# Patient Record
Sex: MALE | Race: WHITE | NOT HISPANIC OR LATINO | URBAN - METROPOLITAN AREA
[De-identification: names, ages, dates, MRNs, and addresses within clinical notes are randomized per-mention and may not be internally consistent; named-entity substitution may affect disease eponyms.]

---

## 2023-09-18 ENCOUNTER — EMERGENCY (EMERGENCY)
Facility: HOSPITAL | Age: 73
LOS: 1 days | Discharge: ANOTHER HOSPITAL | End: 2023-09-18
Attending: EMERGENCY MEDICINE
Payer: MEDICARE

## 2023-09-18 VITALS
SYSTOLIC BLOOD PRESSURE: 137 MMHG | HEART RATE: 71 BPM | OXYGEN SATURATION: 95 % | DIASTOLIC BLOOD PRESSURE: 85 MMHG | RESPIRATION RATE: 18 BRPM | WEIGHT: 195.11 LBS | TEMPERATURE: 98 F

## 2023-09-18 LAB
ALBUMIN SERPL ELPH-MCNC: 3.6 G/DL — SIGNIFICANT CHANGE UP (ref 3.5–5)
ALP SERPL-CCNC: 91 U/L — SIGNIFICANT CHANGE UP (ref 40–120)
ALT FLD-CCNC: 28 U/L DA — SIGNIFICANT CHANGE UP (ref 10–60)
ANION GAP SERPL CALC-SCNC: 6 MMOL/L — SIGNIFICANT CHANGE UP (ref 5–17)
APTT BLD: 30.4 SEC — SIGNIFICANT CHANGE UP (ref 24.5–35.6)
AST SERPL-CCNC: 15 U/L — SIGNIFICANT CHANGE UP (ref 10–40)
BASOPHILS # BLD AUTO: 0.02 K/UL — SIGNIFICANT CHANGE UP (ref 0–0.2)
BASOPHILS NFR BLD AUTO: 0.2 % — SIGNIFICANT CHANGE UP (ref 0–2)
BILIRUB SERPL-MCNC: 0.5 MG/DL — SIGNIFICANT CHANGE UP (ref 0.2–1.2)
BUN SERPL-MCNC: 20 MG/DL — HIGH (ref 7–18)
CALCIUM SERPL-MCNC: 8.7 MG/DL — SIGNIFICANT CHANGE UP (ref 8.4–10.5)
CHLORIDE SERPL-SCNC: 106 MMOL/L — SIGNIFICANT CHANGE UP (ref 96–108)
CO2 SERPL-SCNC: 30 MMOL/L — SIGNIFICANT CHANGE UP (ref 22–31)
CREAT SERPL-MCNC: 1.12 MG/DL — SIGNIFICANT CHANGE UP (ref 0.5–1.3)
EGFR: 69 ML/MIN/1.73M2 — SIGNIFICANT CHANGE UP
EOSINOPHIL # BLD AUTO: 0.04 K/UL — SIGNIFICANT CHANGE UP (ref 0–0.5)
EOSINOPHIL NFR BLD AUTO: 0.4 % — SIGNIFICANT CHANGE UP (ref 0–6)
GLUCOSE SERPL-MCNC: 154 MG/DL — HIGH (ref 70–99)
HCT VFR BLD CALC: 43.9 % — SIGNIFICANT CHANGE UP (ref 39–50)
HGB BLD-MCNC: 15.1 G/DL — SIGNIFICANT CHANGE UP (ref 13–17)
IMM GRANULOCYTES NFR BLD AUTO: 0.2 % — SIGNIFICANT CHANGE UP (ref 0–0.9)
INR BLD: 0.98 RATIO — SIGNIFICANT CHANGE UP (ref 0.85–1.18)
LYMPHOCYTES # BLD AUTO: 1.1 K/UL — SIGNIFICANT CHANGE UP (ref 1–3.3)
LYMPHOCYTES # BLD AUTO: 11.9 % — LOW (ref 13–44)
MCHC RBC-ENTMCNC: 30.9 PG — SIGNIFICANT CHANGE UP (ref 27–34)
MCHC RBC-ENTMCNC: 34.4 GM/DL — SIGNIFICANT CHANGE UP (ref 32–36)
MCV RBC AUTO: 90 FL — SIGNIFICANT CHANGE UP (ref 80–100)
MONOCYTES # BLD AUTO: 0.44 K/UL — SIGNIFICANT CHANGE UP (ref 0–0.9)
MONOCYTES NFR BLD AUTO: 4.8 % — SIGNIFICANT CHANGE UP (ref 2–14)
NEUTROPHILS # BLD AUTO: 7.61 K/UL — HIGH (ref 1.8–7.4)
NEUTROPHILS NFR BLD AUTO: 82.5 % — HIGH (ref 43–77)
NRBC # BLD: 0 /100 WBCS — SIGNIFICANT CHANGE UP (ref 0–0)
PLATELET # BLD AUTO: 217 K/UL — SIGNIFICANT CHANGE UP (ref 150–400)
POTASSIUM SERPL-MCNC: 4.1 MMOL/L — SIGNIFICANT CHANGE UP (ref 3.5–5.3)
POTASSIUM SERPL-SCNC: 4.1 MMOL/L — SIGNIFICANT CHANGE UP (ref 3.5–5.3)
PROT SERPL-MCNC: 6.9 G/DL — SIGNIFICANT CHANGE UP (ref 6–8.3)
PROTHROM AB SERPL-ACNC: 11.2 SEC — SIGNIFICANT CHANGE UP (ref 9.5–13)
RBC # BLD: 4.88 M/UL — SIGNIFICANT CHANGE UP (ref 4.2–5.8)
RBC # FLD: 12.6 % — SIGNIFICANT CHANGE UP (ref 10.3–14.5)
SODIUM SERPL-SCNC: 142 MMOL/L — SIGNIFICANT CHANGE UP (ref 135–145)
TROPONIN I, HIGH SENSITIVITY RESULT: 10 NG/L — SIGNIFICANT CHANGE UP
WBC # BLD: 9.23 K/UL — SIGNIFICANT CHANGE UP (ref 3.8–10.5)
WBC # FLD AUTO: 9.23 K/UL — SIGNIFICANT CHANGE UP (ref 3.8–10.5)

## 2023-09-18 PROCEDURE — 99285 EMERGENCY DEPT VISIT HI MDM: CPT

## 2023-09-18 PROCEDURE — 70450 CT HEAD/BRAIN W/O DYE: CPT | Mod: 26,MA

## 2023-09-18 PROCEDURE — 72125 CT NECK SPINE W/O DYE: CPT | Mod: 26,MA

## 2023-09-18 RX ORDER — TETANUS TOXOID, REDUCED DIPHTHERIA TOXOID AND ACELLULAR PERTUSSIS VACCINE, ADSORBED 5; 2.5; 8; 8; 2.5 [IU]/.5ML; [IU]/.5ML; UG/.5ML; UG/.5ML; UG/.5ML
0.5 SUSPENSION INTRAMUSCULAR ONCE
Refills: 0 | Status: COMPLETED | OUTPATIENT
Start: 2023-09-18 | End: 2023-09-18

## 2023-09-18 NOTE — ED ADULT NURSE NOTE - OBJECTIVE STATEMENT
Patient presented to ED s/p fall at home x today, small abrasion to left side of forehead and back of head, no active bleeding noted. Pt stated to be unsure if LOC. Pt denies any associated pain, dizziness or blurry vision. Steady gait noted.

## 2023-09-18 NOTE — ED PROVIDER NOTE - OBJECTIVE STATEMENT
73 year old male PMH pre-DM coming in with an episode of syncope at home. pt states he got up from kitchen table to get a glass of water and felt a rush and then unsure what happened. wife came home and found the pt on the floor with blood around the area and then pt woke up. unsure how long LOC was for. complains of pain to his head and muffled hearing sensation. states laceration to back of head and small one to forehead. denies all other complaints. 73 year old male PMH pre-DM coming in with an episode of syncope at home. pt states he got up from kitchen table to get a glass of water and felt a rush and then unsure what happened. wife came home and found the pt on the floor with blood around the area and then pt woke up. unsure how long LOC was for. complains of pain to his head and muffled hearing sensation. states laceration to back of head and small one to forehead. denies all other complaints. denies use of ASA or AC. took an advil pta for headache.

## 2023-09-18 NOTE — ED ADULT TRIAGE NOTE - TEMPERATURE IN FAHRENHEIT (DEGREES F)
Impression: Dry eye syndrome of bilateral lacrimal glands: H04.123. Plan: Discussed diagnosis in detail with patient. Discussed treatment options with patient. Patient instructed to use Systane Balance at least qid ou. 97.8

## 2023-09-18 NOTE — ED PROVIDER NOTE - PROGRESS NOTE DETAILS
ct with subarachnoid hemorrhage, intraparenchymal hemorrhage. truama/neurosurg consulted- will transfer to [facility] for eval.

## 2023-09-18 NOTE — ED PROVIDER NOTE - CARE PLAN
Principal Discharge DX:	Subarachnoid hemorrhage  Secondary Diagnosis:	Syncope  Secondary Diagnosis:	Other fracture of skull   1

## 2023-09-18 NOTE — ED ADULT NURSE NOTE - NSFALLUNIVINTERV_ED_ALL_ED
Bed/Stretcher in lowest position, wheels locked, appropriate side rails in place/Call bell, personal items and telephone in reach/Instruct patient to call for assistance before getting out of bed/chair/stretcher/Non-slip footwear applied when patient is off stretcher/[place] to call system/Physically safe environment - no spills, clutter or unnecessary equipment/Purposeful proactive rounding/Room/bathroom lighting operational, light cord in reach

## 2023-09-18 NOTE — ED ADULT TRIAGE NOTE - CHIEF COMPLAINT QUOTE
passed out while in the kitchen with laceration to back of head and forehead, states right ear can't hear well and balance seems not well after fall

## 2023-09-19 ENCOUNTER — INPATIENT (INPATIENT)
Facility: HOSPITAL | Age: 73
LOS: 2 days | Discharge: HOME | DRG: 40 | End: 2023-09-22
Attending: INTERNAL MEDICINE | Admitting: INTERNAL MEDICINE
Payer: MEDICARE

## 2023-09-19 VITALS
WEIGHT: 197.98 LBS | OXYGEN SATURATION: 93 % | RESPIRATION RATE: 18 BRPM | TEMPERATURE: 98 F | HEART RATE: 65 BPM | HEIGHT: 75 IN | DIASTOLIC BLOOD PRESSURE: 84 MMHG | SYSTOLIC BLOOD PRESSURE: 138 MMHG

## 2023-09-19 VITALS
RESPIRATION RATE: 18 BRPM | HEART RATE: 61 BPM | OXYGEN SATURATION: 96 % | TEMPERATURE: 98 F | DIASTOLIC BLOOD PRESSURE: 80 MMHG | SYSTOLIC BLOOD PRESSURE: 136 MMHG

## 2023-09-19 DIAGNOSIS — I60.9 NONTRAUMATIC SUBARACHNOID HEMORRHAGE, UNSPECIFIED: ICD-10-CM

## 2023-09-19 LAB
ALBUMIN SERPL ELPH-MCNC: 4.2 G/DL — SIGNIFICANT CHANGE UP (ref 3.3–5)
ALP SERPL-CCNC: 91 U/L — SIGNIFICANT CHANGE UP (ref 40–120)
ALT FLD-CCNC: 20 U/L — SIGNIFICANT CHANGE UP (ref 10–45)
ANION GAP SERPL CALC-SCNC: 13 MMOL/L — SIGNIFICANT CHANGE UP (ref 5–17)
APTT BLD: 30.1 SEC — SIGNIFICANT CHANGE UP (ref 24.5–35.6)
AST SERPL-CCNC: 20 U/L — SIGNIFICANT CHANGE UP (ref 10–40)
BASOPHILS # BLD AUTO: 0.01 K/UL — SIGNIFICANT CHANGE UP (ref 0–0.2)
BASOPHILS NFR BLD AUTO: 0.1 % — SIGNIFICANT CHANGE UP (ref 0–2)
BILIRUB SERPL-MCNC: 0.6 MG/DL — SIGNIFICANT CHANGE UP (ref 0.2–1.2)
BLD GP AB SCN SERPL QL: NEGATIVE — SIGNIFICANT CHANGE UP
BUN SERPL-MCNC: 19 MG/DL — SIGNIFICANT CHANGE UP (ref 7–23)
CALCIUM SERPL-MCNC: 9.2 MG/DL — SIGNIFICANT CHANGE UP (ref 8.4–10.5)
CHLORIDE SERPL-SCNC: 105 MMOL/L — SIGNIFICANT CHANGE UP (ref 96–108)
CO2 SERPL-SCNC: 24 MMOL/L — SIGNIFICANT CHANGE UP (ref 22–31)
CREAT SERPL-MCNC: 0.92 MG/DL — SIGNIFICANT CHANGE UP (ref 0.5–1.3)
EGFR: 87 ML/MIN/1.73M2 — SIGNIFICANT CHANGE UP
EOSINOPHIL # BLD AUTO: 0.05 K/UL — SIGNIFICANT CHANGE UP (ref 0–0.5)
EOSINOPHIL NFR BLD AUTO: 0.6 % — SIGNIFICANT CHANGE UP (ref 0–6)
GLUCOSE SERPL-MCNC: 115 MG/DL — HIGH (ref 70–99)
HCT VFR BLD CALC: 44.1 % — SIGNIFICANT CHANGE UP (ref 39–50)
HGB BLD-MCNC: 15 G/DL — SIGNIFICANT CHANGE UP (ref 13–17)
IMM GRANULOCYTES NFR BLD AUTO: 0.5 % — SIGNIFICANT CHANGE UP (ref 0–0.9)
INR BLD: 1.08 RATIO — SIGNIFICANT CHANGE UP (ref 0.85–1.18)
LYMPHOCYTES # BLD AUTO: 1.41 K/UL — SIGNIFICANT CHANGE UP (ref 1–3.3)
LYMPHOCYTES # BLD AUTO: 16.4 % — SIGNIFICANT CHANGE UP (ref 13–44)
MCHC RBC-ENTMCNC: 30.8 PG — SIGNIFICANT CHANGE UP (ref 27–34)
MCHC RBC-ENTMCNC: 34 GM/DL — SIGNIFICANT CHANGE UP (ref 32–36)
MCV RBC AUTO: 90.6 FL — SIGNIFICANT CHANGE UP (ref 80–100)
MONOCYTES # BLD AUTO: 0.6 K/UL — SIGNIFICANT CHANGE UP (ref 0–0.9)
MONOCYTES NFR BLD AUTO: 7 % — SIGNIFICANT CHANGE UP (ref 2–14)
NEUTROPHILS # BLD AUTO: 6.51 K/UL — SIGNIFICANT CHANGE UP (ref 1.8–7.4)
NEUTROPHILS NFR BLD AUTO: 75.4 % — SIGNIFICANT CHANGE UP (ref 43–77)
NRBC # BLD: 0 /100 WBCS — SIGNIFICANT CHANGE UP (ref 0–0)
PLATELET # BLD AUTO: 230 K/UL — SIGNIFICANT CHANGE UP (ref 150–400)
POTASSIUM SERPL-MCNC: 3.8 MMOL/L — SIGNIFICANT CHANGE UP (ref 3.5–5.3)
POTASSIUM SERPL-SCNC: 3.8 MMOL/L — SIGNIFICANT CHANGE UP (ref 3.5–5.3)
PROT SERPL-MCNC: 6.9 G/DL — SIGNIFICANT CHANGE UP (ref 6–8.3)
PROTHROM AB SERPL-ACNC: 11.3 SEC — SIGNIFICANT CHANGE UP (ref 9.5–13)
RBC # BLD: 4.87 M/UL — SIGNIFICANT CHANGE UP (ref 4.2–5.8)
RBC # FLD: 12.8 % — SIGNIFICANT CHANGE UP (ref 10.3–14.5)
RH IG SCN BLD-IMP: NEGATIVE — SIGNIFICANT CHANGE UP
SODIUM SERPL-SCNC: 142 MMOL/L — SIGNIFICANT CHANGE UP (ref 135–145)
WBC # BLD: 8.62 K/UL — SIGNIFICANT CHANGE UP (ref 3.8–10.5)
WBC # FLD AUTO: 8.62 K/UL — SIGNIFICANT CHANGE UP (ref 3.8–10.5)

## 2023-09-19 PROCEDURE — 90715 TDAP VACCINE 7 YRS/> IM: CPT

## 2023-09-19 PROCEDURE — 86900 BLOOD TYPING SEROLOGIC ABO: CPT

## 2023-09-19 PROCEDURE — 72125 CT NECK SPINE W/O DYE: CPT | Mod: MA

## 2023-09-19 PROCEDURE — 36415 COLL VENOUS BLD VENIPUNCTURE: CPT

## 2023-09-19 PROCEDURE — 80053 COMPREHEN METABOLIC PANEL: CPT

## 2023-09-19 PROCEDURE — 84484 ASSAY OF TROPONIN QUANT: CPT

## 2023-09-19 PROCEDURE — 70450 CT HEAD/BRAIN W/O DYE: CPT | Mod: 26,MA

## 2023-09-19 PROCEDURE — 86901 BLOOD TYPING SEROLOGIC RH(D): CPT

## 2023-09-19 PROCEDURE — 82962 GLUCOSE BLOOD TEST: CPT

## 2023-09-19 PROCEDURE — 85730 THROMBOPLASTIN TIME PARTIAL: CPT

## 2023-09-19 PROCEDURE — 70450 CT HEAD/BRAIN W/O DYE: CPT | Mod: 26,MA,77

## 2023-09-19 PROCEDURE — 85025 COMPLETE CBC W/AUTO DIFF WBC: CPT

## 2023-09-19 PROCEDURE — 70450 CT HEAD/BRAIN W/O DYE: CPT | Mod: MA

## 2023-09-19 PROCEDURE — 99285 EMERGENCY DEPT VISIT HI MDM: CPT | Mod: 25

## 2023-09-19 PROCEDURE — 85610 PROTHROMBIN TIME: CPT

## 2023-09-19 PROCEDURE — 90471 IMMUNIZATION ADMIN: CPT

## 2023-09-19 PROCEDURE — 99285 EMERGENCY DEPT VISIT HI MDM: CPT

## 2023-09-19 PROCEDURE — 86850 RBC ANTIBODY SCREEN: CPT

## 2023-09-19 RX ORDER — LEVETIRACETAM 250 MG/1
1000 TABLET, FILM COATED ORAL ONCE
Refills: 0 | Status: COMPLETED | OUTPATIENT
Start: 2023-09-19 | End: 2023-09-19

## 2023-09-19 RX ORDER — ACETAMINOPHEN 500 MG
975 TABLET ORAL ONCE
Refills: 0 | Status: COMPLETED | OUTPATIENT
Start: 2023-09-19 | End: 2023-09-19

## 2023-09-19 RX ORDER — LEVETIRACETAM 250 MG/1
500 TABLET, FILM COATED ORAL
Refills: 0 | Status: DISCONTINUED | OUTPATIENT
Start: 2023-09-19 | End: 2023-09-22

## 2023-09-19 RX ADMIN — Medication 975 MILLIGRAM(S): at 22:11

## 2023-09-19 RX ADMIN — Medication 975 MILLIGRAM(S): at 07:47

## 2023-09-19 RX ADMIN — TETANUS TOXOID, REDUCED DIPHTHERIA TOXOID AND ACELLULAR PERTUSSIS VACCINE, ADSORBED 0.5 MILLILITER(S): 5; 2.5; 8; 8; 2.5 SUSPENSION INTRAMUSCULAR at 00:32

## 2023-09-19 RX ADMIN — LEVETIRACETAM 400 MILLIGRAM(S): 250 TABLET, FILM COATED ORAL at 03:24

## 2023-09-19 RX ADMIN — Medication 975 MILLIGRAM(S): at 23:11

## 2023-09-19 NOTE — CONSULT NOTE ADULT - SUBJECTIVE AND OBJECTIVE BOX
CHIEF COMPLAINT:    HISTORY OF PRESENT ILLNESS:  73 year old male PMH pre-DM Presents to the ED as a transfer from  [facility] for subarachnoid hemorrhage after patient had a fall.  Patient transferred to be  seen by neurosurgery.  Patient having no acute complaints right now other than the laceration on his head.  Denying any dizziness, nausea or vomiting.  Pt has had multiple episodes of syncope over the past few years.  feels lightheaded prior to syncope.  He coud be in a sitting position.  Denies tongue biting or urinary incontinence.    PAST MEDICAL & SURGICAL HISTORY:  No pertinent past medical history              MEDICATIONS:                  FAMILY HISTORY:      SOCIAL HISTORY:    [ ] Non-smoker  [ ] Smoker  [ ] Alcohol    Allergies    No Known Allergies    Intolerances    	    REVIEW OF SYSTEMS:  CONSTITUTIONAL: No fever, weight loss, or fatigue  EYES: No eye pain, visual disturbances, or discharge  ENMT:  No difficulty hearing, tinnitus, vertigo; No sinus or throat pain  NECK: No pain or stiffness  RESPIRATORY: No cough, wheezing, chills or hemoptysis; No Shortness of Breath  CARDIOVASCULAR: No chest pain, palpitations, passing out, dizziness, or leg swelling  GASTROINTESTINAL: No abdominal or epigastric pain. No nausea, vomiting, or hematemesis; No diarrhea or constipation. No melena or hematochezia.  GENITOURINARY: No dysuria, frequency, hematuria, or incontinence  NEUROLOGICAL: No headaches, memory loss, loss of strength, numbness, or tremors  SKIN: No itching, burning, rashes, or lesions   LYMPH Nodes: No enlarged glands  ENDOCRINE: No heat or cold intolerance; No hair loss  MUSCULOSKELETAL: No joint pain or swelling; No muscle, back, or extremity pain  PSYCHIATRIC: No depression, anxiety, mood swings, or difficulty sleeping  HEME/LYMPH: No easy bruising, or bleeding gums  ALLERY AND IMMUNOLOGIC: No hives or eczema	    [ ] All others negative	  [ ] Unable to obtain    PHYSICAL EXAM:  T(C): 36.5 (09-19-23 @ 15:41), Max: 36.7 (09-19-23 @ 07:27)  HR: 63 (09-19-23 @ 15:41) (57 - 74)  BP: 122/71 (09-19-23 @ 15:41) (121/78 - 178/113)  RR: 18 (09-19-23 @ 15:41) (16 - 20)  SpO2: 94% (09-19-23 @ 15:41) (93% - 98%)  Wt(kg): --  I&O's Summary      Appearance: Normal	  HEENT:   Normal oral mucosa, PERRL, EOMI	  Lymphatic: No lymphadenopathy  Cardiovascular: Normal S1 S2, No JVD, No murmurs, No edema  Respiratory: Lungs clear to auscultation	  Psychiatry: A & O x 3, Mood & affect appropriate  Gastrointestinal:  Soft, Non-tender, + BS	  Skin: No rashes, No ecchymoses, No cyanosis	  Neurologic: Non-focal  Extremities: Normal range of motion, No clubbing, cyanosis or edema  Vascular: Peripheral pulses palpable 2+ bilaterally    TELEMETRY: 	    ECG:  	  RADIOLOGY:  c< from: CT Head No Cont (09.19.23 @ 10:15) >    ACC: [id number] EXAM:  CT BRAIN   ORDERED BY: TARI PEACOCK DATE:  09/19/2023          INTERPRETATION:  Noncontrast CT of the brain.    CLINICAL INDICATION:  Evaluate for subdural hematoma    TECHNIQUE : Axial CT scanning of the brain was obtained from the skull   base to the vertex without the administration of intravenous contrast.    COMPARISON: Brain CT dated 9/19/2023 at 4:26 AM    FINDINGS:  Redemonstrated left frontal hemorrhagic contusion, unchanged   in appearance.    Redemonstrated is a trace right inferior and left parietal subarachnoid   hemorrhage, both slightly decreased compared with the prior examination.    Redemonstrated nondisplaced right occipital fracture with overlying scalp   hematoma. Unchanged wedge-shaped lucency within the right cerebellar   hemisphere at the level of the fracture may represent nonhemorrhagic   contusion and/or small infarct.    Unchanged linear density in the right anterior parafalcine subarachnoid   space may represent trace subarachnoid hemorrhage versus vessel.    IMPRESSION:    Unchanged left frontal hemorrhagic contusion.  Decreasing right frontal and left parietal subarachnoid hemorrhage.  Unchanged wedge-shaped right cerebellar lucency at the level of the   occipital bone fracture may represent a nonhemorrhagic contusion versus   infarct.  Nondisplaced right occipital fracture with overlying scalp hematoma.    --- End of Report ---            < end of copied text >    OTHER: 	  	  LABS:	 	    CARDIAC MARKERS:                                  15.0   8.62  )-----------( 230      ( 19 Sep 2023 02:58 )             44.1     09-19    142  |  105  |  19  ----------------------------<  115<H>  3.8   |  24  |  0.92    Ca    9.2      19 Sep 2023 02:58    TPro  6.9  /  Alb  4.2  /  TBili  0.6  /  DBili  x   /  AST  20  /  ALT  20  /  AlkPhos  91  09-19    proBNP:   Lipid Profile:   HgA1c:   TSH:            CHIEF COMPLAINT:  Does not see a cardiologist  HISTORY OF PRESENT ILLNESS:  73 year old male PMH pre-DM Presents to the ED as a transfer from  [facility] for subarachnoid hemorrhage after patient had a fall.  Patient transferred to be  seen by neurosurgery.  Patient having no acute complaints right now other than the laceration on his head.  Denying any dizziness, nausea or vomiting.  Pt has had multiple episodes of syncope over the past few years.  feels lightheaded prior to syncope.  He coud be in a sitting position.  Denies tongue biting or urinary incontinence.    PAST MEDICAL & SURGICAL HISTORY:  No pertinent past medical history              MEDICATIONS:                  FAMILY HISTORY:      SOCIAL HISTORY:    [ ] Non-smoker  [ ] Smoker  [ ] Alcohol    Allergies    No Known Allergies    Intolerances    	    REVIEW OF SYSTEMS:  CONSTITUTIONAL: No fever, weight loss, + fatigue  EYES: No eye pain, visual disturbances, or discharge  ENMT:  No difficulty hearing, tinnitus, vertigo; No sinus or throat pain  NECK: No pain or stiffness  RESPIRATORY: No cough, wheezing, chills or hemoptysis; No Shortness of Breath  CARDIOVASCULAR: No chest pain, palpitations, passing out, dizziness, or leg swelling  GASTROINTESTINAL: No abdominal or epigastric pain. No nausea, vomiting, or hematemesis; No diarrhea or constipation. No melena or hematochezia.  GENITOURINARY: No dysuria, frequency, hematuria, or incontinence  NEUROLOGICAL: No headaches, memory loss, loss of strength, numbness, or tremors  SKIN: No itching, burning, rashes, or lesions   LYMPH Nodes: No enlarged glands  ENDOCRINE: No heat or cold intolerance; No hair loss  MUSCULOSKELETAL: No joint pain or swelling; No muscle, back, or extremity pain  PSYCHIATRIC: No depression, anxiety, mood swings, or difficulty sleeping  HEME/LYMPH: No easy bruising, or bleeding gums  ALLERY AND IMMUNOLOGIC: No hives or eczema	    [ ] All others negative	  [ ] Unable to obtain    PHYSICAL EXAM:  T(C): 36.5 (09-19-23 @ 15:41), Max: 36.7 (09-19-23 @ 07:27)  HR: 63 (09-19-23 @ 15:41) (57 - 74)  BP: 122/71 (09-19-23 @ 15:41) (121/78 - 178/113)  RR: 18 (09-19-23 @ 15:41) (16 - 20)  SpO2: 94% (09-19-23 @ 15:41) (93% - 98%)  Wt(kg): --  I&O's Summary      Appearance: Normal	  HEENT:   Normal oral mucosa, PERRL, EOMI	  Lymphatic: No lymphadenopathy  Cardiovascular: Normal S1 S2, No JVD, No murmurs, No edema  Respiratory: Lungs clear to auscultation	  Psychiatry: A & O x 3, Mood & affect appropriate  Gastrointestinal:  Soft, Non-tender, + BS	  Skin: No rashes, No ecchymoses, No cyanosis	  Neurologic: Non-focal  Extremities: Normal range of motion, No clubbing, cyanosis or edema  Vascular: Peripheral pulses palpable 2+ bilaterally    TELEMETRY: SR PACs 	    ECG:  	NSR non specific stt changes   RADIOLOGY:  c< from: CT Head No Cont (09.19.23 @ 10:15) >    ACC: [id number] EXAM:  CT BRAIN   ORDERED BY: TARI SKINNER     PROCEDURE DATE:  09/19/2023          INTERPRETATION:  Noncontrast CT of the brain.    CLINICAL INDICATION:  Evaluate for subdural hematoma    TECHNIQUE : Axial CT scanning of the brain was obtained from the skull   base to the vertex without the administration of intravenous contrast.    COMPARISON: Brain CT dated 9/19/2023 at 4:26 AM    FINDINGS:  Redemonstrated left frontal hemorrhagic contusion, unchanged   in appearance.    Redemonstrated is a trace right inferior and left parietal subarachnoid   hemorrhage, both slightly decreased compared with the prior examination.    Redemonstrated nondisplaced right occipital fracture with overlying scalp   hematoma. Unchanged wedge-shaped lucency within the right cerebellar   hemisphere at the level of the fracture may represent nonhemorrhagic   contusion and/or small infarct.    Unchanged linear density in the right anterior parafalcine subarachnoid   space may represent trace subarachnoid hemorrhage versus vessel.    IMPRESSION:    Unchanged left frontal hemorrhagic contusion.  Decreasing right frontal and left parietal subarachnoid hemorrhage.  Unchanged wedge-shaped right cerebellar lucency at the level of the   occipital bone fracture may represent a nonhemorrhagic contusion versus   infarct.  Nondisplaced right occipital fracture with overlying scalp hematoma.    --- End of Report ---            < end of copied text >    OTHER: 	  	  LABS:	 	    CARDIAC MARKERS:                                  15.0   8.62  )-----------( 230      ( 19 Sep 2023 02:58 )             44.1     09-19    142  |  105  |  19  ----------------------------<  115<H>  3.8   |  24  |  0.92    Ca    9.2      19 Sep 2023 02:58    TPro  6.9  /  Alb  4.2  /  TBili  0.6  /  DBili  x   /  AST  20  /  ALT  20  /  AlkPhos  91  09-19    proBNP:   Lipid Profile:   HgA1c:   TSH:

## 2023-09-19 NOTE — ED PROVIDER NOTE - CARE PLAN
1 Principal Discharge DX:	Subarachnoid hemorrhage   Principal Discharge DX:	Subarachnoid hemorrhage  Secondary Diagnosis:	Syncope

## 2023-09-19 NOTE — ED PROVIDER NOTE - OBJECTIVE STATEMENT
73 year old male PMH pre-DM Presents to the ED as a transfer from  [facility] for subarachnoid hemorrhage after patient had a fall.  Patient transferred to be  seen by neurosurgery.  Patient having no acute complaints right now other than the laceration on his head.  Denying any dizziness, nausea or vomiting.

## 2023-09-19 NOTE — ED PROVIDER NOTE - PHYSICAL EXAMINATION
Const: Well-nourished, Well-developed, appearing stated age.  Eyes: no conjunctival injection, and symmetrical lids.  HEENT: Head NCAT, no lesions. Atraumatic external nose and ears. Moist MM.  Neck: Symmetric, trachea midline.   RESP: Unlabored respiratory effort.   GI: Nontender/Nondistended, No CVA tenderness b/l.   MSK: Normocephalic/Atraumatic,  Skin:  centimeter laceration to occiput and parietal  Neuro: CNs II-XII grossly intact. Motor & Sensation grossly intact.  Psych: Awake, Alert, & Oriented (AAO) x3. Appropriate mood and affect.

## 2023-09-19 NOTE — ED ADULT NURSE NOTE - NSFALLRISKINTERV_ED_ALL_ED

## 2023-09-19 NOTE — CONSULT NOTE ADULT - ASSESSMENT
Jeffrey Smallwood  73M Hx DM p/f fall s/p vasovagal episode. xfer for CTH w/L-frontal contusion. 7h repeat CTH stable. Plt/Coags wnl. No AC/AP  Exam: neurointact   -ok for discharge from neurosurgery perspective once scan read as stable, outpatient follow up with dr. Cordova in 1-2 weeks  -keppra 500 BID for 7d  --160  -hold AC/AP, if the patient is admitted and DVT ppx is otherwise indicated can obtain long interval CTH 12-24h after original, can start dvt chemoppx 24h after if read as stable

## 2023-09-19 NOTE — ED ADULT NURSE REASSESSMENT NOTE - NS ED NURSE REASSESS COMMENT FT1
Neurosurgery at bedside. Safety and comfort measures in place bed in lowest position, siderails upright and call bell within reach.
pt Breathing spontaneous and unlabored on room air, Skin warm and dry and of color appropriate for ethnicity , moves all extremities, speech clear. provider at bedside discussing admission plans and possible echocardiogram with pt. linen changed and meal provided to pt.  no further nurse intervention needed at this time.
Received report from MARISA Martinez .  pt A&Ox4  able to follow all commands. Pulse, motor, sensation present and equal in all 4 extremities. pt Breathing spontaneous and unlabored on lilian air, Skin warm and dry and of color appropriate for ethnicity , moves all extremities, speech clear. pending repeat CT scan, pt complained of pain to head from injury. Tylenol administered as ordered, no further nurse intervention needed at this time.

## 2023-09-19 NOTE — H&P ADULT - HISTORY OF PRESENT ILLNESS
73 year old male PMH pre-DM Presents to the ED as a transfer from  [facility] for subarachnoid hemorrhage after patient had a fall.  Patient transferred to be  seen by neurosurgery.  Patient having no acute complaints right now other than the laceration on his head.  Denying any dizziness, nausea or vomiting.  Pt has had multiple episodes of syncope over the past few years.  feels lightheaded prior to syncope.  He coud be in a sitting position.  Denies tongue biting or urinary incontinence.

## 2023-09-19 NOTE — ED PROVIDER NOTE - CLINICAL SUMMARY MEDICAL DECISION MAKING FREE TEXT BOX
73 year old male PMH pre-DM Presents to the ED as a transfer from  [facility] for subarachnoid hemorrhage after patient had a fall.  Patient transferred to be  seen by neurosurgery.  Patient having no acute complaints right now other than the laceration on his head.  Denying any dizziness, nausea or vomiting.  We will get repeat CT head and consult neurosurgery.

## 2023-09-19 NOTE — ED ADULT NURSE NOTE - OBJECTIVE STATEMENT
PT is a 73 y.o male c.o head bleed. PT is A&Ox3 and resting in stretcher. PT was transferred via EMS PT is a 73 y.o male c.o head bleed. PT is A&Ox3 and resting in stretcher. PT was transferred via EMS from [facility]. Pt had a syncopal episode earlier last night which resulted in head strike. PT had +LOC. PT reports he has had these "episodes" for the past 25 years. PT states he has an "aura, collapses for 30 seconds, and comes back". Pt had CT scan at [facility] which revealed a subdural hematoma. Spontaneously breathing on RA>95%, head lac noted to back of scalp, peripheral pulses present, NIHSS 0. Pt denies any headache, N/V/D, fever, chills, weakness or dizziness. Safety and comfort measures in place bed in lowest position, siderails upright and call bell within reach.

## 2023-09-19 NOTE — H&P ADULT - NSHPLABSRESULTS_GEN_ALL_CORE
LABS:                        15.0   8.62  )-----------( 230      ( 19 Sep 2023 02:58 )             44.1     09-19    142  |  105  |  19  ----------------------------<  115<H>  3.8   |  24  |  0.92    Ca    9.2      19 Sep 2023 02:58    TPro  6.9  /  Alb  4.2  /  TBili  0.6  /  DBili  x   /  AST  20  /  ALT  20  /  AlkPhos  91  09-19    PT/INR - ( 19 Sep 2023 02:58 )   PT: 11.3 sec;   INR: 1.08 ratio         PTT - ( 19 Sep 2023 02:58 )  PTT:30.1 sec  Urinalysis Basic - ( 19 Sep 2023 02:58 )    Color: x / Appearance: x / SG: x / pH: x  Gluc: 115 mg/dL / Ketone: x  / Bili: x / Urobili: x   Blood: x / Protein: x / Nitrite: x   Leuk Esterase: x / RBC: x / WBC x   Sq Epi: x / Non Sq Epi: x / Bacteria: x            RADIOLOGY & ADDITIONAL TESTS:
16

## 2023-09-19 NOTE — ED PROVIDER NOTE - NSFOLLOWUPINSTRUCTIONS_ED_ALL_ED_FT
Please continue taking the medication Keppra every 12 hours for the next week.    Please follow-up with Dr. Torres in his office in 1 week

## 2023-09-19 NOTE — ED PROVIDER NOTE - ATTENDING CONTRIBUTION TO CARE
Attending MD COMFORT Carlos I performed a history and physical exam of the patient and discussed their management with the resident. I reviewed the resident's note and agree with the documented findings and plan of care, except as noted. My medical decision making and observations are as follows:    73 M with PMH pre-DM transferred from [facility] for traumatic subarachnoid hemorrhage.  Patient originally presented to sending facility for syncope and fall with preceding flushing sensation, noted to have laceration to back of head and forehead in subarachnoid hemorrhage imaging.  Patient states he previously had muffled hearing, but this has since resolved.  Denies headache, chest pain, shortness of breath, cough, abdominal pain, GI symptoms, dysuria.  On exam, patient in no acute distress, noted to have superficial lacerations to forehead and occipital scalp.  Moving all extremities, cranial nerves II to XII intact, strength and sensation intact to extremities.  No tenderness to palpation of C-spine, trunk, extremities.    MDM–elderly male presenting with syncope and head strike with noted traumatic subarachnoid hemorrhage and nondisplaced skull fracture, clinically stable.  Will obtain repeat imaging to assess for worsening/interval bleeds.    CT head showing stable anterior inferior left frontal lobe hemorrhage with new foci of subarachnoid hemorrhage in left parietal and right frontal lobes.  Discussed case with neurosurgery, recommending another 4-hour interval CT head but no acute neurosurgical intervention.  Discussed findings with patient and recommended medical admission for evaluation of syncope, patient unsure prefers to wait for results of repeat CT imaging.    0700–admit to Dr. Severino pending repeat CT final disposition.

## 2023-09-19 NOTE — CONSULT NOTE ADULT - SUBJECTIVE AND OBJECTIVE BOX
p ([id number])     HPI: Jeffrey Smallwood  73M Hx DM p/f fall s/p vasovagal episode. xfer for CTH w/L-frontal contusion. 7h repeat CTH stable. Plt/Coags wnl. No AC/AP  Exam: neurointact     --Anticoagulation: denied    =====================  PAST MEDICAL HISTORY     PAST SURGICAL HISTORY     No Known Allergies      MEDICATIONS:  Antibiotics:    Neuro:    Other:      SOCIAL HISTORY:   Occupation:   Marital Status:     FAMILY HISTORY:      ROS: Negative except per HPI    LABS:  PT/INR - ( 19 Sep 2023 02:58 )   PT: 11.3 sec;   INR: 1.08 ratio         PTT - ( 19 Sep 2023 02:58 )  PTT:30.1 sec                        15.0   8.62  )-----------( 230      ( 19 Sep 2023 02:58 )             44.1     09-19    142  |  105  |  19  ----------------------------<  115<H>  3.8   |  24  |  0.92    Ca    9.2      19 Sep 2023 02:58    TPro  6.9  /  Alb  4.2  /  TBili  0.6  /  DBili  x   /  AST  20  /  ALT  20  /  AlkPhos  91  09-19

## 2023-09-19 NOTE — CHART NOTE - NSCHARTNOTEFT_GEN_A_CORE
Repeat interval CTH reviewed w/ stable findings.  -no acute neurosurgical intervention   -Outpatient f/u w/ Dr. Cordova in 1-2 weeks

## 2023-09-19 NOTE — CONSULT NOTE ADULT - ASSESSMENT
73 year old male PMH pre-DM Presents to the ED as a transfer from  [facility] for subarachnoid hemorrhage after patient had a fall.  Patient transferred to be  seen by neurosurgery.  Patient having no acute complaints right now other than the laceration on his head.  Denying any dizziness, nausea or vomiting.  Pt has had multiple episodes of syncope over the past few years.  feels lightheaded prior to syncope.  He coud be in a sitting position.  Denies tongue biting or urinary incontinence.

## 2023-09-19 NOTE — ED PROVIDER NOTE - PROGRESS NOTE DETAILS
Philip Arora, PGY3  neurosurgery at bedside.  Patient is pending repeat CT head. Philip Arora, [id number] with neurosurgery results of patient's CT.  They recommended to get another repeat 4-hour scan to ensure stability.  I discussed this with patient who was upset stating that he was told previously that he would be able to go home by neurosurgery After the first scan.  I discussed this with neurosurgery who Had told the patient that they were waiting for the official results before they could give the final dispo. Philip Arora, PGY3 Patient currently awaiting a repeat CT at 830.  I discussed with patient that they will need a further syncope work-up.  Patient states he is unsure if he would like this done here in the hospital as this has been occurring for over 25 years now.  He is discussing with his wife whether or not he will be amenable to staying for the syncope work-up. Jerald Diaz, DO PGY-3: Repeat head CT grossly stable, spoke with neurosurgery who states no intervention and actually can follow-up outpatient.  Will discuss admission for syncope work-up with patient. pt agreeable to stay for syncope w/u

## 2023-09-20 DIAGNOSIS — R55 SYNCOPE AND COLLAPSE: ICD-10-CM

## 2023-09-20 DIAGNOSIS — I60.9 NONTRAUMATIC SUBARACHNOID HEMORRHAGE, UNSPECIFIED: ICD-10-CM

## 2023-09-20 LAB
ANION GAP SERPL CALC-SCNC: 11 MMOL/L — SIGNIFICANT CHANGE UP (ref 5–17)
BUN SERPL-MCNC: 15 MG/DL — SIGNIFICANT CHANGE UP (ref 7–23)
CALCIUM SERPL-MCNC: 9.1 MG/DL — SIGNIFICANT CHANGE UP (ref 8.4–10.5)
CHLORIDE SERPL-SCNC: 106 MMOL/L — SIGNIFICANT CHANGE UP (ref 96–108)
CO2 SERPL-SCNC: 24 MMOL/L — SIGNIFICANT CHANGE UP (ref 22–31)
CREAT SERPL-MCNC: 0.98 MG/DL — SIGNIFICANT CHANGE UP (ref 0.5–1.3)
EGFR: 80 ML/MIN/1.73M2 — SIGNIFICANT CHANGE UP
FOLATE SERPL-MCNC: 10.9 NG/ML — SIGNIFICANT CHANGE UP
GLUCOSE SERPL-MCNC: 118 MG/DL — HIGH (ref 70–99)
HCT VFR BLD CALC: 45.9 % — SIGNIFICANT CHANGE UP (ref 39–50)
HCV AB S/CO SERPL IA: 0.04 S/CO — SIGNIFICANT CHANGE UP (ref 0–0.99)
HCV AB SERPL-IMP: SIGNIFICANT CHANGE UP
HGB BLD-MCNC: 15.3 G/DL — SIGNIFICANT CHANGE UP (ref 13–17)
MAGNESIUM SERPL-MCNC: 2.2 MG/DL — SIGNIFICANT CHANGE UP (ref 1.6–2.6)
MCHC RBC-ENTMCNC: 30.5 PG — SIGNIFICANT CHANGE UP (ref 27–34)
MCHC RBC-ENTMCNC: 33.3 GM/DL — SIGNIFICANT CHANGE UP (ref 32–36)
MCV RBC AUTO: 91.4 FL — SIGNIFICANT CHANGE UP (ref 80–100)
NRBC # BLD: 0 /100 WBCS — SIGNIFICANT CHANGE UP (ref 0–0)
PHOSPHATE SERPL-MCNC: 2.8 MG/DL — SIGNIFICANT CHANGE UP (ref 2.5–4.5)
PLATELET # BLD AUTO: 213 K/UL — SIGNIFICANT CHANGE UP (ref 150–400)
POTASSIUM SERPL-MCNC: 4.3 MMOL/L — SIGNIFICANT CHANGE UP (ref 3.5–5.3)
POTASSIUM SERPL-SCNC: 4.3 MMOL/L — SIGNIFICANT CHANGE UP (ref 3.5–5.3)
RBC # BLD: 5.02 M/UL — SIGNIFICANT CHANGE UP (ref 4.2–5.8)
RBC # FLD: 12.8 % — SIGNIFICANT CHANGE UP (ref 10.3–14.5)
SODIUM SERPL-SCNC: 141 MMOL/L — SIGNIFICANT CHANGE UP (ref 135–145)
TSH SERPL-MCNC: 0.68 UIU/ML — SIGNIFICANT CHANGE UP (ref 0.27–4.2)
VIT B12 SERPL-MCNC: 397 PG/ML — SIGNIFICANT CHANGE UP (ref 232–1245)
WBC # BLD: 6.52 K/UL — SIGNIFICANT CHANGE UP (ref 3.8–10.5)
WBC # FLD AUTO: 6.52 K/UL — SIGNIFICANT CHANGE UP (ref 3.8–10.5)

## 2023-09-20 PROCEDURE — 93306 TTE W/DOPPLER COMPLETE: CPT | Mod: 26

## 2023-09-20 PROCEDURE — 70547 MR ANGIOGRAPHY NECK W/O DYE: CPT | Mod: 26

## 2023-09-20 PROCEDURE — 70551 MRI BRAIN STEM W/O DYE: CPT | Mod: 26

## 2023-09-20 PROCEDURE — 70544 MR ANGIOGRAPHY HEAD W/O DYE: CPT | Mod: 26,XU

## 2023-09-20 RX ORDER — SODIUM CHLORIDE 9 MG/ML
1000 INJECTION INTRAMUSCULAR; INTRAVENOUS; SUBCUTANEOUS
Refills: 0 | Status: DISCONTINUED | OUTPATIENT
Start: 2023-09-20 | End: 2023-09-21

## 2023-09-20 RX ADMIN — SODIUM CHLORIDE 100 MILLILITER(S): 9 INJECTION INTRAMUSCULAR; INTRAVENOUS; SUBCUTANEOUS at 12:29

## 2023-09-20 RX ADMIN — LEVETIRACETAM 500 MILLIGRAM(S): 250 TABLET, FILM COATED ORAL at 17:31

## 2023-09-20 RX ADMIN — LEVETIRACETAM 500 MILLIGRAM(S): 250 TABLET, FILM COATED ORAL at 05:39

## 2023-09-20 NOTE — PHYSICAL THERAPY INITIAL EVALUATION ADULT - ADDITIONAL COMMENTS
pt lives at home with spouse in a condo, +walkin entrance, +elevator access to 5th floor, PTA ind amb and ADLs, no device, +Driving, +retired, +distance/reading glasses

## 2023-09-20 NOTE — PATIENT PROFILE ADULT - FALL HARM RISK - HARM RISK INTERVENTIONS
Communicate Risk of Fall with Harm to all staff/Monitor gait and stability/Reinforce activity limits and safety measures with patient and family/Tailored Fall Risk Interventions/Visual Cue: Yellow wristband and red socks/Bed in lowest position, wheels locked, appropriate side rails in place/Call bell, personal items and telephone in reach/Instruct patient to call for assistance before getting out of bed or chair/Non-slip footwear when patient is out of bed/[place] to call system/Physically safe environment - no spills, clutter or unnecessary equipment/Purposeful Proactive Rounding/Room/bathroom lighting operational, light cord in reach

## 2023-09-20 NOTE — PROGRESS NOTE ADULT - ASSESSMENT
73 year old male PMH pre-DM Presents to the ED as a transfer from  [facility] for subarachnoid hemorrhage after patient had a fall.  Patient transferred to be  seen by neurosurgery.  Patient having no acute complaints right now other than the laceration on his head.  Denying any dizziness, nausea or vomiting.  Pt has had multiple episodes of syncope over the past few years.  feels lightheaded prior to syncope.  He coud be in a sitting position.  Denies tongue biting or urinary incontinence.    syncope  - tele monitor  - TTE  - positive orthostatics -  start iv fluids  - cardio consult  - neuro consult  - MRI brain w/o  - MRA H/N     SAH  - Repeat interval CTH  w/ stable findings.  -no acute neurosurgical intervention  -Outpatient f/u w/ Dr. Cordova in 1-2 weeks.

## 2023-09-20 NOTE — PROGRESS NOTE ADULT - ASSESSMENT
73 year old male PMH pre-DM Presents to the ED as a transfer from  [facility] for subarachnoid hemorrhage after patient had a fall.  Patient transferred to be  seen by neurosurgery.  Patient having no acute complaints right now other than the laceration on his head.  Denying any dizziness, nausea or vomiting.  Pt has had multiple episodes of syncope over the past few years.  feels lightheaded prior to syncope.  He coud be in a sitting position.  Denies tongue biting or urinary incontinence.

## 2023-09-20 NOTE — PHYSICAL THERAPY INITIAL EVALUATION ADULT - PLANNED THERAPY INTERVENTIONS, PT EVAL
pt is functionally independent, DC from PT program at this time, reconsult if changes occur thank you.

## 2023-09-20 NOTE — CONSULT NOTE ADULT - SUBJECTIVE AND OBJECTIVE BOX
Neurology Consult    Reason for Consult: Patient is a 73y old  Male who presents with a chief complaint of syncope (20 Sep 2023 08:49)      HPI:  73 year old male PMH pre-DM Presents to the ED as a transfer from  [facility] for subarachnoid hemorrhage after patient had a fall.  Patient transferred to be  seen by neurosurgery.  Patient having no acute complaints right now other than the laceration on his head.  Denying any dizziness, nausea or vomiting.  Pt has had multiple episodes of syncope over the past few years.  feels lightheaded prior to syncope.  He coud be in a sitting position.  Denies tongue biting or urinary incontinence. (19 Sep 2023 17:11)       PAST MEDICAL & SURGICAL HISTORY:  No pertinent past medical history          Allergies: Allergies    No Known Allergies    Intolerances        Social History: Denies toxic habits including tobacco, ETOH or illicit drugs.    Family History: FAMILY HISTORY:  . No family history of strokes    Medications: MEDICATIONS  (STANDING):  levETIRAcetam 500 milliGRAM(s) Oral two times a day    MEDICATIONS  (PRN):      Review of Systems:  CONSTITUTIONAL:  No weight loss, fever, chills, weakness or fatigue.  HEENT:  Eyes:  No visual loss, blurred vision, double vision or yellow sclera. Ears, Nose, Throat:  No hearing loss, sneezing, congestion, runny nose or sore throat.  SKIN:  No rash or itching.  CARDIOVASCULAR:  No chest pain, chest pressure or chest discomfort. No palpitations or edema.  RESPIRATORY:  No shortness of breath, cough or sputum.  GASTROINTESTINAL:  No anorexia, nausea, vomiting or diarrhea. No abdominal pain or blood.  GENITOURINARY:  No burning on urination or incontinence   NEUROLOGICAL:  No headache, dizziness,+ syncope, paralysis, ataxia, numbness or tingling in the extremities. No change in bowel or bladder control. no limb weakness. no vision changes.   MUSCULOSKELETAL:  No muscle, back pain, joint pain or stiffness.  HEMATOLOGIC:  No anemia, bleeding or bruising.  LYMPHATICS:  No enlarged nodes. No history of splenectomy.  PSYCHIATRIC:  No history of depression or anxiety.  ENDOCRINOLOGIC:  No reports of sweating, cold or heat intolerance. No polyuria or polydipsia.      Vitals:  Vital Signs Last 24 Hrs  T(C): 36.7 (20 Sep 2023 09:00), Max: 36.7 (19 Sep 2023 21:23)  T(F): 98 (20 Sep 2023 09:00), Max: 98.1 (19 Sep 2023 21:23)  HR: 63 (20 Sep 2023 09:00) (60 - 83)  BP: 125/76 (20 Sep 2023 09:00) (121/78 - 178/113)  BP(mean): 91 (19 Sep 2023 12:10) (91 - 91)  RR: 18 (20 Sep 2023 09:00) (18 - 20)  SpO2: 93% (20 Sep 2023 09:00) (93% - 98%)    Parameters below as of 20 Sep 2023 09:00  Patient On (Oxygen Delivery Method): room air  Orthostatic VS        General Exam:   General Appearance: Appropriately dressed and in no acute distress       Head: Normocephalic, atraumatic and no dysmorphic features  Ear, Nose, and Throat: Moist mucous membranes  CVS: S1S2+  Resp: No SOB, no wheeze or rhonchi  GI: soft NT/ND  Extremities: No edema or cyanosis  Skin: No bruises or rashes     Neurological Exam:  Mental Status: Awake, alert and oriented x 3.  Able to follow simple and complex verbal commands. Able to name and repeat. fluent speech. No obvious aphasia or dysarthria noted.   Cranial Nerves: PERRL, EOMI, VFFC, sensation V1-V3 intact,  no obvious facial asymmetry, equal elevation of palate, scm/trap 5/5, tongue is midline on protrusion. no obvious papilledema on fundoscopic exam. hearing is grossly intact.   Motor: Normal bulk, tone and strength throughout. Fine finger movements were intact and symmetric. no tremors or drift noted.    Sensation: Intact to light touch and pinprick throughout. no right/left confusion. no extinction to tactile on DSS. Romberg was negative.   Reflexes: 1+ throughout at biceps, brachioradialis, triceps, patellars and ankles bilaterally and equal. No clonus. R toe and L toe were both downgoing.  Coordination: No dysmetria on FNF or HKS  Gait: Narrow based and steady. Able to tandem. no limitations in gait.     Data/Labs/Imaging which I personally reviewed.     Labs:     CBC Full  -  ( 20 Sep 2023 07:10 )  WBC Count : 6.52 K/uL  RBC Count : 5.02 M/uL  Hemoglobin : 15.3 g/dL  Hematocrit : 45.9 %  Platelet Count - Automated : 213 K/uL  Mean Cell Volume : 91.4 fl  Mean Cell Hemoglobin : 30.5 pg  Mean Cell Hemoglobin Concentration : 33.3 gm/dL  Auto Neutrophil # : x  Auto Lymphocyte # : x  Auto Monocyte # : x  Auto Eosinophil # : x  Auto Basophil # : x  Auto Neutrophil % : x  Auto Lymphocyte % : x  Auto Monocyte % : x  Auto Eosinophil % : x  Auto Basophil % : x    09-20    141  |  106  |  15  ----------------------------<  118<H>  4.3   |  24  |  0.98    Ca    9.1      20 Sep 2023 07:10  Phos  2.8     09-20  Mg     2.2     09-20    TPro  6.9  /  Alb  4.2  /  TBili  0.6  /  DBili  x   /  AST  20  /  ALT  20  /  AlkPhos  91  09-19    LIVER FUNCTIONS - ( 19 Sep 2023 02:58 )  Alb: 4.2 g/dL / Pro: 6.9 g/dL / ALK PHOS: 91 U/L / ALT: 20 U/L / AST: 20 U/L / GGT: x           PT/INR - ( 19 Sep 2023 02:58 )   PT: 11.3 sec;   INR: 1.08 ratio         PTT - ( 19 Sep 2023 02:58 )  PTT:30.1 sec  Urinalysis Basic - ( 20 Sep 2023 07:10 )    Color: x / Appearance: x / SG: x / pH: x  Gluc: 118 mg/dL / Ketone: x  / Bili: x / Urobili: x   Blood: x / Protein: x / Nitrite: x   Leuk Esterase: x / RBC: x / WBC x   Sq Epi: x / Non Sq Epi: x / Bacteria: x    < from: CT Head No Cont (09.18.23 @ 21:39) >    ACC: [id number] EXAM:  CT CERVICAL SPINE   ORDERED BY: JOSE RODRIGUEZ     ACC: [id number] EXAM:  CT BRAIN   ORDERED BY: JOSE RODRIGUEZ     PROCEDURE DATE:  09/18/2023          INTERPRETATION:  CLINICAL INFORMATION: Syncope and fall.    COMPARISON: None.    PROCEDURE:  Noncontrast CT of the head and cervical spine. Coronal and Sagittal   reformats were obtained.    FINDINGS:    CT head:    Left anterior inferior frontal lobe subarachnoid hemorrhage and   additional tiny rounded foci of hyperdensity, which may reflect tiny   contusions. Tiny hyperdensities within the anterior inferior right   frontal lobe may represent additional foci of hemorrhage versus artifact.   No significant mass effect, midline shift. Tiny right parafalcine   extra-axial hemorrhage (2:30).    The visualized paranasal sinuses and mastoid air cells are clear. Right   parietal scalp soft tissue swelling. Nondisplaced right occipital   calvarial fracture.    CT cervical spine:    No acute cervical spine fracture or evidence of traumatic malalignment.   Nonspecific straightening of the normal cervical lordosis. Craniocervical   junction is unremarkable. No facet joint dislocation. Fusion of the right   C2-C3 facets. No prevertebral soft tissue swelling.    There are multilevel degenerative change of the spine characterized by   degenerative disc disease as well as uncovertebral and facet joint   arthrosis, contributing to varying degrees of mild neuroforaminal and   spinal canal stenoses.    Visualized lung apices are clear.    IMPRESSION:    CT Head: Left anterior inferior frontal lobe subarachnoid hemorrhage and   likely small hemorrhagic parenchymal contusions. Possible tiny foci of   right anterior inferior frontal lobe hemorrhage, however evaluation is   limited by adjacent bone. Tiny focus of right interhemispheric   extra-axial hemorrhage.    Nondisplaced right occipital calvarial fracture with overlying scalp   hematoma.    CT cervical spine: No acute cervical spine fracture or evidence of   traumatic malalignment.    Dr. Love discussed the above findings with Dr. Rodriguez at 10:13 PM on   9/18/2023 with read back.    --- End of Report ---            DEANNE LOVE MD; Attending Radiologist  This document has been electronically signed. Sep 18 2023 10:16PM    < end of copied text >      < from: CT Head No Cont (09.19.23 @ 10:15) >    ACC: [id number] EXAM:  CT BRAIN   ORDERED BY: TARI SKINNER     PROCEDURE DATE:  09/19/2023          INTERPRETATION:  Noncontrast CT of the brain.    CLINICAL INDICATION:  Evaluate for subdural hematoma    TECHNIQUE : Axial CT scanning of the brain was obtained from the skull   base to the vertex without the administration of intravenous contrast.    COMPARISON: Brain CT dated 9/19/2023 at 4:26 AM    FINDINGS:  Redemonstrated left frontal hemorrhagic contusion, unchanged   in appearance.    Redemonstrated is a trace right inferior and left parietal subarachnoid   hemorrhage, both slightly decreased compared with the prior examination.    Redemonstrated nondisplaced right occipital fracture with overlying scalp   hematoma. Unchanged wedge-shaped lucency within the right cerebellar   hemisphere at the level of the fracture may represent nonhemorrhagic   contusion and/or small infarct.    Unchanged linear density in the right anterior parafalcine subarachnoid   space may represent trace subarachnoid hemorrhage versus vessel.    IMPRESSION:    Unchanged left frontal hemorrhagic contusion.  Decreasing right frontal and left parietal subarachnoid hemorrhage.  Unchanged wedge-shaped right cerebellar lucency at the level of the   occipital bone fracture may represent a nonhemorrhagic contusion versus   infarct.  Nondisplaced right occipital fracture with overlying scalp hematoma.    --- End of Report ---            ANGELINA MAGANA MD; Attending Radiologist  This document has been electronicallysigned. Sep 19 2023 10:27AM    < end of copied text >

## 2023-09-20 NOTE — CONSULT NOTE ADULT - ASSESSMENT
73 year old male with pre-DM Presents to the ED as a transfer from  [facility] for subarachnoid hemorrhage after patient had a fall.  Patient transferred to be  seen by neurosurgery.  Patient having no acute complaints right now other than the laceration on his head.  Denying any dizziness, nausea or vomiting.  Pt has had multiple episodes of syncope over the past few years.  feels lightheaded prior to syncope.  He coud be in a sitting position.  Denies tongue biting or urinary incontinence.   CTH L frontal SDAH and contusion.  small right frontal hemorrahge.   CT C spine neg   repeat CTH uhcnaged L frontal hemorrhagic contusion. decrease R and L frontal SAH; R occipital fracture noted   NIHSS 0 premrs 0    Impression:   1) Syncope/fall, states he has several episodes a year for many years   2) trauautic SAH  3) cerebellar hypodensity on R likely nonhemorrhagic contusion vs stroke   4) LE distal numbness likely neuyropahty     - MRI brain w/o  - MRA H/N   - keppra x 7 days per nsx for seizure ppx   - check orthosetatics   - cardiac eval; recs appreicated   - TTE  - rEEG    - Hemoglobin A1c and lipid panel  - telemetry  - PT/OT  - check FS, glucose control <180  - GI/DVT ppx  - Counseling on diet, exercise, and medication adherence was done  - Counseling on smoking cessation and alcohol consumption offered when appropriate.  - Pain assessed and judicious use of narcotics when appropriate was discussed.    - Stroke education given when appropriate.  - Importance of fall prevention discussed.   - Differential diagnosis and plan of care discussed with patient and/or family and primary team  - Thank you for allowing me to participate in the care of this patient. Call with questions.     Kyree Vergara MD  Vascular Neurology  Office: [phone]  73 year old male with pre-DM Presents to the ED as a transfer from  [facility] for subarachnoid hemorrhage after patient had a fall.  Patient transferred to be  seen by neurosurgery.  Patient having no acute complaints right now other than the laceration on his head.  Denying any dizziness, nausea or vomiting.  Pt has had multiple episodes of syncope over the past few years.  feels lightheaded prior to syncope.  He coud be in a sitting position.  Denies tongue biting or urinary incontinence.   CTH L frontal SDAH and contusion.  small right frontal hemorrahge.   CT C spine neg   repeat CTH uhcnaged L frontal hemorrhagic contusion. decrease R and L frontal SAH; R occipital fracture noted   NIHSS 0 premrs 0    Impression:   1) Syncope/fall, states he has several episodes a year for many years   2) trauautic SAH  3) cerebellar hypodensity on R likely nonhemorrhagic contusion vs stroke   4) LE distal numbness likely neuyropahty     - MRI brain w/o  - MRA H/N   - keppra x 7 days per nsx for seizure ppx   - check orthosetatics   - cardiac eval; recs appreicated   - TTE  - rEEG    - Hemoglobin A1c and lipid panel  - telemetry  - can try gabapentin 300 QHS  - emg lowers outpatient   - check b12   - PT/OT  - check FS, glucose control <180  - GI/DVT ppx  - Counseling on diet, exercise, and medication adherence was done  - Counseling on smoking cessation and alcohol consumption offered when appropriate.  - Pain assessed and judicious use of narcotics when appropriate was discussed.    - Stroke education given when appropriate.  - Importance of fall prevention discussed.   - Differential diagnosis and plan of care discussed with patient and/or family and primary team  - Thank you for allowing me to participate in the care of this patient. Call with questions.     Kyree Vergara MD  Vascular Neurology  Office: [phone]

## 2023-09-20 NOTE — PATIENT PROFILE ADULT - FUNCTIONAL ASSESSMENT - BASIC MOBILITY 6.
Outpatient Cardiology Clinic Follow-Up Progress Note  [facility]  [address] [facility] [facility] [address]  Dept Phone: [phone]       Patient Name: Adelina Ricks  MRN:[id number]  :1947      Date: 2023  PCP: Elio Stephens III, MD  Cardiologist: BLADIMIR Tello  Nephrologist:   Discharge date: 2023  NYHA class: III  Weight at discharge: 78.8 kg (173.36 pounds)  Weight at last visit: 82.1 kg (180.62 pounds) with prothesis  Weight today: 79.5 kg (174.9 pounds) without prothesis    Referring Physician  Elio Stephens III, MD  Fax: [phone]    Chief Complaint   Patient presents with   • Office Visit   • Congestive Heart Failure   • Follow-up         HISTORY OF PRESENT ILLNESS  Adelina Ricks is a 75 year old female who presents with past medical history of chronic HFrEF (EF 45%),PAF: recent DCCV  --recurrent AF,S/P BiV, Pacemaker,NIDA, EF 45-50%, fixed defects on recent stress,CAD / CABG , stable anatomy --angio 23,Recent NSTEMI, CKD,HTN, right BKA. She was recently admitted with right finger gangrene with osteomyelitis s/p amputation who has had a prolonged hospitalization complicated by NTSTEMI s/p C with stable disease, left elbow bursitis requiring I&D and abx, urinary retention, hematuria found to have severe cystitis, HF and worsening renal function. She was discharged then readmitted with dyspnea, was diuresed then readmitted again with hypoglycemia.  She was last seen in the HF clinic last week. Patient denies any chest pain, lightheadedness, palpitations or dyspnea. Her weight is stable since discharge.    ASSESSMENT/PLAN  1. Acute on chronic heart failure, unspecified heart failure type (CMS/HCC)    2. Ischemic cardiomyopathy    3. Chronic kidney disease, unspecified CKD stage    4. Hypotension, unspecified hypotension type        Plan:  Patient will continue her current medications.  She will see her PCP as scheduled.  Home  health to draw a BMP next week on patient. Order is in Epic.  Patient will continue to weigh themself daily.  They will call Dr. Tello's office with weight gain of 3 lb in a day or 5 lb in a week.  Reinforced the importance of a low sodium diet (2000 mg/day) as well as a fluid restriction of 64 fl oz a day   Call the HF Clinic with any questions or concerns        Orders Placed This Encounter   • NT proBNP   • Comprehensive Metabolic Panel   • Comprehensive Metabolic Panel   • digoxin (LANOXIN) 0.125 MG tablet   • pregabalin (LYRICA) 100 MG capsule   • losartan (COZAAR) 25 MG tablet   • Omega-3 Fatty Acids (FISH OIL OMEGA-3 PO)   • insulin detemir (LEVEMIR) 100 UNIT/ML injectable solution        Return to Clinic: No follow-ups on file. Patient instructed to have all ordered testing prior to follow-up visit.     REVIEW OF SYSTEMS  A 12-point Review of Systems was performed and is negative except for HPI.     PHYSICAL EXAM  Vitals:    02/13/23 1237 02/13/23 1305 02/13/23 1308   BP:  92/50 102/56   BP Location:  RUE - Right upper extremity RUE - Right upper extremity   Patient Position:  Sitting Sitting   Cuff Size:  Small Adult Small Adult   Pulse:  70    Weight: 79.5 kg (175 lb 4.3 oz)         Body mass index is 31.05 kg/m².    Wt Readings from Last 4 Encounters:   02/13/23 79.5 kg (175 lb 4.3 oz)   02/07/23 82.1 kg (181 lb)   02/06/23 82.1 kg (181 lb)   02/05/23 78.8 kg (173 lb 11.6 oz)       Vitals and weights were reviewed during today's visit.    Gen: no acute distress, AOx3  Neck: Supple, no JVP  CV: RRR, S1, S2, No G/R/M  Chest: Lungs BCTA, non-labored respirations   Ext: warm, well perfused, no LE edema    CARDIOVASCULAR DIAGNOSTIC STUDIES    I personally reviewed: labs, notes echo  Discussed with: patient        Transthoracic Echocardiogram: 1/21/23  STUDY CONCLUSIONS  SUMMARY:     1. Procedure narrative: A transthoracic echocardiogram was performed. Image     quality was suboptimal. The study was  technically limited due to body     habitus.  2. Left ventricle: The cavity size is normal. Wall thickness is increased.     Systolic function is mildly reduced. The ejection fraction was measured by     visual estimation. The ejection fraction is 45%.  3. Aortic valve: Prior repair procedures include TAVR. There is no significant     regurgitation. The mean systolic gradient is 11mm Hg. The peak systolic     gradient is 17mm Hg.  4. Mitral valve: The annulus is moderately calcified. The leaflets are     moderately thickened and moderately calcified. Leaflet separation is     reduced. Inflow velocity is increased. The findings are consistent with     mild to moderate stenosis. There is trivial regurgitation. The mean     diastolic gradient is 6mm Hg.  5. Left atrium: The atrium is dilated.  6. Right ventricle: The cavity size is normal. Wall thickness is normal.     Systolic function is normal. Pacemaker lead noted in right ventricle.  7. Right atrium: Pacemaker lead noted in right atrium.  8. Pericardium, extracardiac: There is no pericardial effusion.  9. Paced rhythm.  IMPRESSIONS:   The study shows no significant change since the previous study.        Recent Labs     04/06/22  1225 06/04/22  1755 07/11/22  0845 09/13/22  0832 09/28/22  2317 09/29/22  0252 01/06/23  0842 02/02/23  0902 02/04/23  1759 02/04/23  1922 02/06/23  1322 02/13/23  1245   CHOLESTEROL  --   --  135 147  --  146  --   --   --   --   --   --    CALCLDL  --   --   --  54  --  49  --   --   --   --   --   --    HDL  --   --  25* 31*  --  24*  --   --   --   --   --   --    TRIGLYCERIDE  --   --  469* 310*  --  366*  --   --   --   --   --   --    AST  --    < > 30 20   < >  --    < > 27 40*  --   --  22   SODIUM 142   < > 135 138   < > 137   < > 141 142  --  141 138   CHLORIDE 109*   < > 101 105   < > 103   < > 103 106  --  103 100   BUN 15   < > 17 15   < > 14   < > 24* 30*  --  31* 25*   BCRAT 19   < > 19 17   < > 16   < > 18 22  --  22 22    POTASSIUM 3.9   < > 4.3 3.6   < > 3.8   < > 3.9 5.5* 4.4 4.4 4.1   GLUCOSE 177*   < > 337* 213*   < > 396*   < > 149* 100*  --  236* 235*   CREATININE 0.77   < > 0.88 0.90   < > 0.86   < > 1.33* 1.39*  --  1.44* 1.14*   CALCIUM 9.7   < > 9.3 8.9   < > 9.1   < > 9.0 8.9  --  8.9 9.5   TSH  --   --  0.330* 0.418  --   --   --   --   --   --   --   --    HGBA1C 10.4*  --  11.4* 11.0*  --   --   --   --   --   --   --   --     < > = values in this interval not displayed.     Lab Results   Component Value Date/Time    T4FREE 1.4 07/11/2022 08:45 AM    T4FREE 1.6 07/12/2018 12:00 AM     Recent Labs     02/02/23  0902 02/04/23  1759 02/09/23  1049   WBC 4.1* 4.8 5.0   RBC 4.26 4.27 4.37   HGB 12.1 12.1 12.6   HCT 38.6 38.6 40.4   MCV 90.6 90.4 92.4   MCHC 31.3* 31.3* 31.2*   RDWCV 18.7* 18.6* 18.7*    333 267   TLYMPH 32 35 31     Lab Results   Component Value Date/Time    ALBUMIN 2.6 (L) 02/13/2023 12:45 PM    ALBUMIN 3.0 (L) 06/02/2020 02:50 PM    BILIRUBIN 0.7 02/13/2023 12:45 PM    BILIRUBIN 0.6 06/02/2020 02:50 PM    ALKPT 95 02/13/2023 12:45 PM    ALKPT 117 06/02/2020 02:50 PM    GPT 14 02/13/2023 12:45 PM    GPT 28 06/02/2020 02:50 PM       PATIENT HISTORIES    Past Medical History:   Diagnosis Date   • Acute osteomyelitis of right foot (CMS/[facility]) 09/14/2015   • MINE (acute kidney injury) (CMS/[facility]) 1/7/2023   • Amputated right leg (CMS/HCC)    • Arthritis    • Atrial fibrillation (CMS/HCC)    • Atrioventricular block     2/97 [facility]/nonobstructive disease, 3rd degree AV block, 5/08, 6/12   • Carotid artery stenosis, asymptomatic, unspecified laterality    • Complex regional pain syndrome I    • Coronary artery disease    • Diabetes mellitus (CMS/HCC)    • Diabetic neuropathy (CMS/HCC)      diabetic neuropathy lower extremities   • Dyslipidemia    • Essential hypertension    • High cholesterol    • Hyperlipoproteinemia    • Malignant neoplasm (CMS/HCC)     breast   • MI, acute, non ST segment  elevation (CMS/[facility]) 03/03/2016 1/2016   • Neuroma of amputation stump, right lower extremity (CMS/[facility]) 06/15/2021   • Post-operative infection 03/24/2020    [facility]:Right foot/leg   • Sleep apnea     uses CPAP, setting 9   • Thyroid disease    • Vitamin D deficiency        Past Surgical History:   Procedure Laterality Date   • Amputation      Rt. great toe 07/15   • Anes cataract surgery,complex     • Breast lumpectomy Left    • Cardiac device check - in clinic      [employer] pacemaker 5/16 - upgrade to Bi-V pacer 11/18   • Cardiac surgery     • Cardioversion  01/2023   • Case request clinic to cath/vasc      January 2016   • Colectomy       colon resection 1/13   • Coronary artery bypass graft     • Cystoscopy     • Finger amputation Right 01/08/2023    Middle Finger; Partial   • Finger surgery  2013   • Hysterectomy     • Joint replacement Bilateral     knee   • Knee surgery Right 11/2004   • Other surgical history      [id number]   • Pancreas surgery  08/16/2022    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 02/10/16.   • Pr drug-eluting stents, single      diagonal, drug eluting stent February 2016,   • Revision of eyelid,< 1/4 lid margin     • Saphenous vein graft resection      saphenous vein graft to the RV lateral branch 02/19/16   • Total knee replacement Bilateral        Family History     Relation Problem Comments    Maternal Grandfather Coronary Artery Disease    Heart disease        Mother Coronary Artery Disease        Other Coronary Artery Disease  Significant for premature CAD.              Social History     Tobacco Use   • Smoking status: Former     Types: Cigarettes     Quit date: 1980      Years since quittin.1   • Smokeless tobacco: Never   • Tobacco comments:      Former tobacco use. used to smoke but quit and 1980   Vaping Use   • Vaping Use: never used   Substance Use Topics   • Alcohol use: Yes     Alcohol/week: 1.0 standard drink     Types: 1 Shots of liquor per week     Comment: drinks socially and 1 drink per month.   • Drug use: Not Currently       Allergies   Allergen Reactions   • Alprazolam RASH   • Clindamycin Other (See Comments)   • Levofloxacin Other (See Comments)   • Sulfa Antibiotics Other (See Comments)   • Vancomycin PRURITUS   • Epinephrine Other (See Comments)     Jittery, rapid heart beat   • Adhesive   (Environmental) Other (See Comments)     Cloth bandage Causes skin breakdown.    • Erythromycin DIARRHEA     diarrhea     • Hydrocodone-Acetaminophen ANXIETY   • Tetracycline DIARRHEA   • Valacyclovir Hcl GI UPSET              Current Medications    ACCU-CHEK GUIDE TEST STRIP    CHECK BLOOD SUGAR THREE TIMES DAILY    APIXABAN (ELIQUIS) 5 MG TAB    Take 1 tablet by mouth every 12 hours.    ASPIRIN 81 MG EC TABLET    Take 81 mg by mouth daily.    ATORVASTATIN (LIPITOR) 80 MG TABLET    Take 1 tablet by mouth daily.    B COMPLEX VITAMINS (B COMPLEX PO)    Take 1 tablet by mouth daily.    BD INSULIN SYRINGE U/F 31G X \" 0.5 ML MISC    USE AND DISCARD THREE TIMES A DAY.    BLOOD GLUCOSE MONITORING SUPPL (ACCU-CHEK GUIDE) W/DEVICE KIT    accu-chek guide  w/device kit    CLOPIDOGREL (PLAVIX) 75 MG TABLET    TAKE 1 TABLET BY MOUTH EVERY DAY    DICLOFENAC (VOLTAREN) 1 % GEL    Apply 2 g topically in the morning and 2 g at noon and 2 g in the evening. Apply to hands    DIGOXIN (LANOXIN) 0.125 MG TABLET    125 mcg daily.    HUMALOG 100 UNIT/ML INJECTABLE SOLUTION    Inject 4 Units into the skin in the morning and 4 Units at noon and 4 Units in the evening. Inject before meals. Indications: Type 2 Diabetes.    HYDROXYZINE (ATARAX) 50 MG TABLET    Take 1 tablet by mouth every 6 hours  as needed for Anxiety.    INSULIN DETEMIR (LEVEMIR) 100 UNIT/ML INJECTABLE SOLUTION    Inject 12 Units into the skin nightly.    INSULIN PEN NEEDLE 32G X 4 MM MISC    Use as directed    ISOSORBIDE DINITRATE (ISORDIL) 10 MG TABLET    Take 1 tablet by mouth in the morning and 1 tablet at noon and 1 tablet in the evening.    JARDIANCE 25 MG TABLET    TAKE 1 TABLET BY MOUTH EVERY DAY    LEVOTHYROXINE 150 MCG TABLET    TAKE 1 TABLET BY MOUTH EVERY MORNING ON AN EMPTY STOMACH    LIDOCAINE (XYLOCAINE) 5 % OINTMENT    Apply topically as needed for Pain (apply to hands for pain control).    LOSARTAN (COZAAR) 25 MG TABLET    Take 12.5 mg by mouth daily.    METRONIDAZOLE (FLAGYL) 500 MG TABLET    Take 1 tablet by mouth in the morning and 1 tablet at noon and 1 tablet in the evening. Do all this for 14 days.    OMEGA-3 FATTY ACIDS (FISH OIL OMEGA-3 PO)    Take 4,000 mg by mouth daily.    ONDANSETRON (ZOFRAN) 4 MG TABLET    Take 1 tablet by mouth every 8 hours as needed for Nausea (prior to antibiotic infusion).    POLYETHYLENE GLYCOL 400 (BLINK TEARS OP)    Apply 1 drop to eye as needed (dry eyes).    POTASSIUM CHLORIDE (KLOR-CON M) 20 MEQ PAMELLA ER TABLET    Take 1 tablet by mouth in the morning and 1 tablet in the evening. Do not start before February 2, 2023.    PREGABALIN (LYRICA) 100 MG CAPSULE    Take 100 mg by mouth in the morning and 100 mg at noon and 100 mg in the evening.    SENNOSIDES-DOCUSATE SODIUM (SENOKOT-S) 8.6-50 MG TABLET    Take 1 tablet by mouth daily.    TORSEMIDE (DEMADEX) 20 MG TABLET    Take 1 tablet by mouth 2 times daily. Do not start before February 2, 2023.    TRAMADOL (ULTRAM) 50 MG TABLET    Take 1 tablet by mouth every 6 hours as needed for Pain.         For this visit I performed pre-charting, chart review, documenting and referring/communicating with other health care professionals. Allergies and Medications were reviewed with the patient and updated during today's visit. In addition, I discussed  medication dosage, usage, goals of therapy, and side effects with patient. Medication reconciliation was done but medications not prescribed by me may be inaccurate. The patient's previous laboratory and cardiac diagnostic testing listed above has been reviewed and analyzed to establish my current plan of care. Previous outside notes were reviewed and taken into consideration when deciding further treatment.    Amanda Gan, CNP  Heart Failure Nurse Practitioner                      4 = No assist / stand by assistance

## 2023-09-20 NOTE — PHYSICAL THERAPY INITIAL EVALUATION ADULT - GENERAL OBSERVATIONS, REHAB EVAL
Pt is s/p fall, Acute SAH 2/2 fall- CTH w/ stable right frontal/left parietal SAH, no intervention per neurosx. Pt received supine in bed on stretcher (orange 66L), +IVL, A&OX4, +tele.

## 2023-09-20 NOTE — PHYSICAL THERAPY INITIAL EVALUATION ADULT - NSPTDISCHREC_GEN_A_CORE
DC home with no skilled PT needs, assist from spouse as needed, JENNI Anthony aware./No skilled PT needs

## 2023-09-20 NOTE — PHYSICAL THERAPY INITIAL EVALUATION ADULT - PERTINENT HX OF CURRENT PROBLEM, REHAB EVAL
Pt is a 73 year old male admitted to [facility] on 9/19/23 PMH pre-DM Presents to the ED as a transfer from  [facility] for subarachnoid hemorrhage after patient had a fall.  Pt transferred to be  seen by neurosurgery.  Pt having no acute complaints right now other than the laceration on his head.  Denying any dizziness, nausea or vomiting. Pt has had multiple episodes of syncope over the past few years. feels lightheaded prior to syncope.  He coud be in a sitting position.  Denies tongue biting or urinary incontinence. Hospital course:  Repeat interval CTH  w/ stable findings. no acute neurosurgical intervention, +tele monitor, +TTE, orthostatics. Per neurosx, s/p vasovagal episode. xfer for CTH w/L-frontal contusion. 7h repeat CTH stable. Plt/Coags wnl. No AC/AP. CT Head: Left anterior inferior frontal lobe subarachnoid hemorrhage and likely small hemorrhagic parenchymal contusions. Possible tiny foci of right anterior inferior frontal lobe hemorrhage, however evaluation is limited by adjacent bone. Tiny focus of right interhemispheric extra-axial hemorrhage. Nondisplaced right occipital calvarial fracture with overlying scalp hematoma. CT cervical spine: No acute cervical spine fracture or evidence of traumatic malalignment. 2nd CTH: Tiny focus of new subarachnoid hemorrhage in the anterior left   parietal lobe. Stable hemorrhagic contusion in the anterior inferior left frontal lobe. Stable tiny focus of subarachnoid hemorrhage in the anterior inferior right frontal lobe. Improving tiny focus of subarachnoid hemorrhage in the anterior right parafalcine region.Nondisplaced right occipital fracture. 3rd CTH: Unchanged left frontal hemorrhagic contusion. Decreasing right frontal and left parietal subarachnoid hemorrhage. Unchanged wedge-shaped right cerebellar lucency at the level of the occipital bone fracture may represent a nonhemorrhagic contusion versus infarct. Nondisplaced right occipital fracture with overlying scalp hematoma.

## 2023-09-21 RX ORDER — ACETAMINOPHEN 500 MG
650 TABLET ORAL ONCE
Refills: 0 | Status: COMPLETED | OUTPATIENT
Start: 2023-09-21 | End: 2023-09-21

## 2023-09-21 RX ORDER — DIPHENHYDRAMINE HCL 50 MG
25 CAPSULE ORAL ONCE
Refills: 0 | Status: COMPLETED | OUTPATIENT
Start: 2023-09-21 | End: 2023-09-21

## 2023-09-21 RX ORDER — METOCLOPRAMIDE HCL 10 MG
10 TABLET ORAL ONCE
Refills: 0 | Status: COMPLETED | OUTPATIENT
Start: 2023-09-21 | End: 2023-09-21

## 2023-09-21 RX ORDER — KETOROLAC TROMETHAMINE 30 MG/ML
30 SYRINGE (ML) INJECTION ONCE
Refills: 0 | Status: DISCONTINUED | OUTPATIENT
Start: 2023-09-21 | End: 2023-09-21

## 2023-09-21 RX ADMIN — Medication 650 MILLIGRAM(S): at 16:34

## 2023-09-21 RX ADMIN — SODIUM CHLORIDE 100 MILLILITER(S): 9 INJECTION INTRAMUSCULAR; INTRAVENOUS; SUBCUTANEOUS at 09:10

## 2023-09-21 RX ADMIN — Medication 650 MILLIGRAM(S): at 15:46

## 2023-09-21 RX ADMIN — LEVETIRACETAM 500 MILLIGRAM(S): 250 TABLET, FILM COATED ORAL at 06:33

## 2023-09-21 RX ADMIN — LEVETIRACETAM 500 MILLIGRAM(S): 250 TABLET, FILM COATED ORAL at 17:57

## 2023-09-21 NOTE — PROGRESS NOTE ADULT - ASSESSMENT
73 year old male PMH pre-DM Presents to the ED as a transfer from  [facility] for subarachnoid hemorrhage after patient had a fall.  Patient transferred to be  seen by neurosurgery.  Patient having no acute complaints right now other than the laceration on his head.  Denying any dizziness, nausea or vomiting.  Pt has had multiple episodes of syncope over the past few years.  feels lightheaded prior to syncope.  He coud be in a sitting position.  Denies tongue biting or urinary incontinence.

## 2023-09-21 NOTE — PROGRESS NOTE ADULT - ASSESSMENT
73 year old male with pre-DM Presents to the ED as a transfer from  [facility] for subarachnoid hemorrhage after patient had a fall.  Patient transferred to be  seen by neurosurgery.  Patient having no acute complaints right now other than the laceration on his head.  Denying any dizziness, nausea or vomiting.  Pt has had multiple episodes of syncope over the past few years.  feels lightheaded prior to syncope.  He coud be in a sitting position.  Denies tongue biting or urinary incontinence.   CTH L frontal SDAH and contusion.  small right frontal hemorrahge.   CT C spine neg   repeat CTH uhcnaged L frontal hemorrhagic contusion. 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  SAH from prior   MRA H/N Neg   b12 397   TTE EF 60%     Impression:   1) Syncope/fall, states he has several episodes a year for many years   2) trauautic SAH  3) cerebellar hypodensity on R likely nonhemorrhagic contusion vs stroke   4) LE distal numbness likely neuyropahty   5) emoblic apearing R cerebellar and L frontal infarct, ESUS        - ASA 81mg when cleared by neurosx  - high dose statin, atorvastatin 80mg QHS  - keppra x 7 days per nsx for seizure ppx   - check orthostatics   - cardiac eval; recs appreicated   - rEEG    - Hemoglobin A1c and lipid panel  - telemetry; would benefit from extended cardiac monitoring   - can try gabapentin 300 QHS  - emg lowers outpatient   - check b12 --> low. would supplement   - PT/OT  - check FS, glucose control <180  - GI/DVT ppx   - Thank you for allowing me to participate in the care of this patient. Call with questions.     Kyree Vergara MD  Vascular Neurology  Office: [phone]

## 2023-09-21 NOTE — PROGRESS NOTE ADULT - ASSESSMENT
73 year old male PMH pre-DM Presents to the ED as a transfer from  [facility] for subarachnoid hemorrhage after patient had a fall.  Patient transferred to be  seen by neurosurgery.  Patient having no acute complaints right now other than the laceration on his head.  Denying any dizziness, nausea or vomiting.  Pt has had multiple episodes of syncope over the past few years.  feels lightheaded prior to syncope.  He coud be in a sitting position.  Denies tongue biting or urinary incontinence.    syncope  - tele monitor  - TTE  - positive orthostatics -  start iv fluids  - cardio consult  - neuro consult  - MRI brain w/o MRA H/N shows acute cva  - ep called for ILR    SAH  - Repeat interval CTH  w/ stable findings.  -no acute neurosurgical intervention  -Outpatient f/u w/ Dr. Cordova in 1-2 weeks.     73 year old male PMH pre-DM Presents to the ED as a transfer from  [facility] for subarachnoid hemorrhage after patient had a fall.  Patient transferred to be  seen by neurosurgery.  Patient having no acute complaints right now other than the laceration on his head.  Denying any dizziness, nausea or vomiting.  Pt has had multiple episodes of syncope over the past few years.  feels lightheaded prior to syncope.  He coud be in a sitting position.  Denies tongue biting or urinary incontinence.    syncope  - tele monitor  - TTE  - positive orthostatics -  d/c iv fluids  - cardio consult  - neuro consult  - MRI brain w/o MRA H/N shows acute cva  - ep called for ILR    SAH  - Repeat interval CTH  w/ stable findings.  -no acute neurosurgical intervention  -Outpatient f/u w/ Dr. Cordova in 1-2 weeks.

## 2023-09-21 NOTE — CONSULT NOTE ADULT - SUBJECTIVE AND OBJECTIVE BOX
HISTORY OF PRESENT ILLNESS: HPI:  73 year old male PMH pre-DM Presents to the ED as a transfer from  [facility] for subarachnoid hemorrhage after patient had a fall.  Patient transferred to be seen by neurosurgery.  Patient having no acute complaints right now other than the laceration on his head.  Denying any dizziness, nausea or vomiting.  Pt has had multiple episodes of syncope over the past few years.  feels lightheaded prior to syncope.  He coud be in a sitting position.  Denies tongue biting or urinary incontinence. (19 Sep 2023 17:11)    MRI with multiple acute CVAs.  He reports mild headache, otherwise comfortable.  No prior cardiac work up.      PAST MEDICAL & SURGICAL HISTORY:  No pertinent past medical history      MEDICATIONS  (STANDING):  acetaminophen     Tablet .. 650 milliGRAM(s) Oral once  levETIRAcetam 500 milliGRAM(s) Oral two times a day      Allergies  No Known Allergies      FAMILY HISTORY:  Noncontributory for premature coronary disease or sudden cardiac death    SOCIAL HISTORY:    [x ] Non-smoker  [ ] Smoker  [ ] Alcohol    FLU VACCINE THIS YEAR STARTS IN AUGUST:  [ ] Yes    [ ] No    IF OVER 65 HAVE YOU EVER HAD A PNA VACCINE:  [ ] Yes    [ ] No       [ ] N/A      REVIEW OF SYSTEMS:  [ ]chest pain  [  ]shortness of breath  [  ]palpitations  [  ]syncope  [ ]near syncope [ ]upper extremity weakness   [ ] lower extremity weakness  [  ]diplopia  [  ]altered mental status   [  ]fevers  [ ]chills [ ]nausea  [ ]vomiting  [  ]dysphagia    [ ]abdominal pain  [ ]melena  [ ]BRBPR    [  ]epistaxis  [  ]rash    [ ]lower extremity edema        [x ] All others negative	  [ ] Unable to obtain      LABS:	 	               15.3   6.52  )-----------( 213      ( 20 Sep 2023 07:10 )             45.9     141  |  106  |  15  ----------------------------<  118<H>  4.3   |  24  |  0.98    Ca    9.1      20 Sep 2023 07:10  Phos  2.8     09-20  Mg     2.2     09-20    Creatinine Trend: 0.98<--, 0.92<--, 1.12<--      PHYSICAL EXAM:  T(C): 36.8 (09-21-23 @ 14:40), Max: 37.1 (09-20-23 @ 21:35)  HR: 56 (09-21-23 @ 14:40) (56 - 118)  BP: 122/69 (09-21-23 @ 14:40) (122/69 - 147/82)  RR: 18 (09-21-23 @ 14:40) (18 - 18)  SpO2: 95% (09-21-23 @ 14:40) (93% - 97%)  Wt(kg): --   BMI (kg/m2): 24.7 (09-19-23 @ 01:41)    Gen: Appears well in NAD  HEENT:  (-)icterus (-)pallor  CV: N S1 S2 1/6 MICHAEL (+)2 Pulses B/l  Resp:  Clear to ausculatation B/L, normal effort  GI: (+) BS Soft, NT, ND  Lymph:  (-)Edema, (-)obvious lymphadenopathy  Skin: Warm to touch, Normal turgor  Psych: Appropriate mood and affect      TELEMETRY: 	SR      ECG:  	NSR    < from: MR Angio Neck No Cont (09.20.23 @ 12:48) >  IMPRESSION:    MRI BRAIN: Acute right cerebellum and left frontal lobe infarctions.   Stable left frontal lobe hemorrhagic contusion with moderate adjacent   edema. Trace intraventricular hemorrhage in the left occipital horn. The   patient's known trace subarachnoid hemorrhage in the right frontal lobe   and left parietal lobe are better seen on the recent head CT.   Nondisplaced right parietal occipital calvarial fracture.      MRA BRAIN: No large vessel occlusion.    MRA NECK: No significant stenosis or large vessel occlusion.    < end of copied text >    < from: TTE W or WO Ultrasound Enhancing Agent (09.20.23 @ 13:40) >  CONCLUSIONS:      1. Leftventricular systolic function is normal with an ejection fraction of 60 % by Camargo's method of disks.   2. There is normal left ventricular diastolic function.   3. Right ventricular cavity is normal in size and normal systolic function.   4. Mild aortic regurgitation.   5. No prior echocardiogram is available for comparison.    < end of copied text >      ASSESSMENT/PLAN: 	73 year old male PMH pre-DM s/p syncope with SAH and 2 acute CVAs on MRI.  EP consulted for syncope and ILR eval    --MRI noted with embolic strokes  --Neuro eval noted  --no events on tele thus far  --d/w patient and wife re: ILR implant for extended cardiac monitoring, they will think about it      Carmelita CLOUD  [phone]

## 2023-09-21 NOTE — CONSULT NOTE ADULT - NS ATTEND AMEND GEN_ALL_CORE FT
syncope w/ brain trauma (contusions / edema).  also identified multifocal acute strokes. worrisome for embolic etiology.  will strongly recommend ILR monitoring, specifically for AFib surveillance after ESUS.  can implant at bedside tomorrow.

## 2023-09-22 ENCOUNTER — TRANSCRIPTION ENCOUNTER (OUTPATIENT)
Age: 73
End: 2023-09-22

## 2023-09-22 VITALS
SYSTOLIC BLOOD PRESSURE: 117 MMHG | OXYGEN SATURATION: 93 % | RESPIRATION RATE: 18 BRPM | HEART RATE: 54 BPM | DIASTOLIC BLOOD PRESSURE: 71 MMHG | TEMPERATURE: 98 F

## 2023-09-22 LAB
ANION GAP SERPL CALC-SCNC: 15 MMOL/L — SIGNIFICANT CHANGE UP (ref 5–17)
BUN SERPL-MCNC: 21 MG/DL — SIGNIFICANT CHANGE UP (ref 7–23)
CALCIUM SERPL-MCNC: 9.1 MG/DL — SIGNIFICANT CHANGE UP (ref 8.4–10.5)
CHLORIDE SERPL-SCNC: 103 MMOL/L — SIGNIFICANT CHANGE UP (ref 96–108)
CO2 SERPL-SCNC: 21 MMOL/L — LOW (ref 22–31)
CREAT SERPL-MCNC: 0.94 MG/DL — SIGNIFICANT CHANGE UP (ref 0.5–1.3)
EGFR: 85 ML/MIN/1.73M2 — SIGNIFICANT CHANGE UP
GLUCOSE SERPL-MCNC: 133 MG/DL — HIGH (ref 70–99)
HCT VFR BLD CALC: 46 % — SIGNIFICANT CHANGE UP (ref 39–50)
HGB BLD-MCNC: 15.6 G/DL — SIGNIFICANT CHANGE UP (ref 13–17)
MAGNESIUM SERPL-MCNC: 2 MG/DL — SIGNIFICANT CHANGE UP (ref 1.6–2.6)
MCHC RBC-ENTMCNC: 30.4 PG — SIGNIFICANT CHANGE UP (ref 27–34)
MCHC RBC-ENTMCNC: 33.9 GM/DL — SIGNIFICANT CHANGE UP (ref 32–36)
MCV RBC AUTO: 89.5 FL — SIGNIFICANT CHANGE UP (ref 80–100)
NRBC # BLD: 0 /100 WBCS — SIGNIFICANT CHANGE UP (ref 0–0)
PHOSPHATE SERPL-MCNC: 3.1 MG/DL — SIGNIFICANT CHANGE UP (ref 2.5–4.5)
PLATELET # BLD AUTO: 208 K/UL — SIGNIFICANT CHANGE UP (ref 150–400)
POTASSIUM SERPL-MCNC: 4 MMOL/L — SIGNIFICANT CHANGE UP (ref 3.5–5.3)
POTASSIUM SERPL-SCNC: 4 MMOL/L — SIGNIFICANT CHANGE UP (ref 3.5–5.3)
RBC # BLD: 5.14 M/UL — SIGNIFICANT CHANGE UP (ref 4.2–5.8)
RBC # FLD: 12.6 % — SIGNIFICANT CHANGE UP (ref 10.3–14.5)
SODIUM SERPL-SCNC: 139 MMOL/L — SIGNIFICANT CHANGE UP (ref 135–145)
WBC # BLD: 5.69 K/UL — SIGNIFICANT CHANGE UP (ref 3.8–10.5)
WBC # FLD AUTO: 5.69 K/UL — SIGNIFICANT CHANGE UP (ref 3.8–10.5)

## 2023-09-22 RX ORDER — ACETAMINOPHEN 500 MG
650 TABLET ORAL ONCE
Refills: 0 | Status: COMPLETED | OUTPATIENT
Start: 2023-09-22 | End: 2023-09-22

## 2023-09-22 RX ORDER — LEVETIRACETAM 250 MG/1
1 TABLET, FILM COATED ORAL
Qty: 8 | Refills: 0
Start: 2023-09-22 | End: 2023-09-25

## 2023-09-22 RX ADMIN — Medication 650 MILLIGRAM(S): at 06:01

## 2023-09-22 RX ADMIN — LEVETIRACETAM 500 MILLIGRAM(S): 250 TABLET, FILM COATED ORAL at 06:01

## 2023-09-22 RX ADMIN — Medication 650 MILLIGRAM(S): at 06:56

## 2023-09-22 NOTE — DISCHARGE NOTE PROVIDER - CARE PROVIDERS DIRECT ADDRESSES
,[email or web address],DirectAddress_Unknown,DirectAddress_Unknown,DirectAddress_Unknown

## 2023-09-22 NOTE — DISCHARGE NOTE PROVIDER - PROVIDER TOKENS
PROVIDER:[TOKEN:[[id number]],FOLLOWUP:[2 weeks]] PROVIDER:[TOKEN:[[id number]],FOLLOWUP:[2 weeks]],PROVIDER:[TOKEN:[[id number]]],PROVIDER:[TOKEN:[[id number]]],PROVIDER:[TOKEN:[[id number]]]

## 2023-09-22 NOTE — DISCHARGE NOTE PROVIDER - NSDCFUADDAPPT_GEN_ALL_CORE_FT
Follow up with Dr. Cordova, neuro surgery, in 1-2 wks.    Make appointments to follow up with your out patient physicians.  Bring all discharge paperwork including discharge medication list to your follow up appointments.

## 2023-09-22 NOTE — PROGRESS NOTE ADULT - PROBLEM SELECTOR PLAN 2
Nsx consulted   Neuro checks    MRI reviewed   Seizure ppx
Nsx consulted   Neuro checks    MRI reviewed   Seizure ppx
Nsx consulted   Neuro checks   follow up CT head   MRI.  Seizure ppx

## 2023-09-22 NOTE — DISCHARGE NOTE PROVIDER - CARE PROVIDER_API CALL
Joshua Cordova  Neurosurgery  [address], [address]  [place] [address]  Phone: [phone]  Fax: [phone]  Follow Up Time: 2 weeks   Joshua Cordova  Neurosurgery  [address], [address]  [place] [address]  Phone: [phone]  Fax: [phone]  Follow Up Time: 2 weeks    Anthony Valencia  Cardiology  [address], [address]  [place] [address]  Phone: [phone]  Fax: [phone]  Follow Up Time:     Kyree Vergara  Neurology  [address], [address]  [place] [address]  Phone: [phone]  Fax: [phone]  Follow Up Time:     NEEMA HENSON DO, AFET  [address] [place] [address]  Phone: [phone]  Fax: [phone]  Follow Up Time:

## 2023-09-22 NOTE — PROGRESS NOTE ADULT - PROBLEM SELECTOR PROBLEM 2
Intracranial subarachnoid hemorrhage

## 2023-09-22 NOTE — DISCHARGE NOTE PROVIDER - NSDCCPCAREPLAN_GEN_ALL_CORE_FT
PRINCIPAL DISCHARGE DIAGNOSIS  Diagnosis: Subarachnoid hemorrhage  Assessment and Plan of Treatment: Follow up with Dr. Cordova, neuro, in 1-2 weeks      SECONDARY DISCHARGE DIAGNOSES  Diagnosis: Syncope  Assessment and Plan of Treatment: HOME CARE INSTRUCTIONS  Have someone stay with you until you feel stable.  Do not drive, operate machinery, or play sports until your caregiver says it is okay.  Keep all follow-up appointments as directed by your caregiver.   Lie down right away if you start feeling like you might faint. Breathe deeply and steadily. Wait until all the symptoms have passed.Drink enough fluids to keep your urine clear or pale yellow.  If you are taking blood pressure or heart medicine, get up slowly, taking several minutes to sit and then stand. This can reduce dizziness.  SEEK IMMEDIATE MEDICAL CARE IF:  You have a severe headache.  You have unusual pain in the chest, abdomen, or back.  You are bleeding from the mouth or rectum, or you have black or tarry stool.  You have an irregular or very fast heartbeat.  You have pain with breathing.  You have repeated fainting or seizure-like jerking during an episode.  You faint when sitting or lying down.  You have confusion.  You have difficulty walking.  You have severe weakness.  You have vision problems.  If you fainted, call your local emergency services. Do not drive yourself to the hospital    Diagnosis: Orthostatic hypotension  Assessment and Plan of Treatment: Improved  You received IV fluids  Drink plenty of fluids to stay well hydrated.     PRINCIPAL DISCHARGE DIAGNOSIS  Diagnosis: Subarachnoid hemorrhage  Assessment and Plan of Treatment: Follow up with Dr. Cordova, neuro, in 1-2 weeks      SECONDARY DISCHARGE DIAGNOSES  Diagnosis: Syncope  Assessment and Plan of Treatment: HOME CARE INSTRUCTIONS  Have someone stay with you until you feel stable.  Do not drive, operate machinery, or play sports until your caregiver says it is okay.  Keep all follow-up appointments as directed by your caregiver.   Lie down right away if you start feeling like you might faint. Breathe deeply and steadily. Wait until all the symptoms have passed.Drink enough fluids to keep your urine clear or pale yellow.  If you are taking blood pressure or heart medicine, get up slowly, taking several minutes to sit and then stand. This can reduce dizziness.  SEEK IMMEDIATE MEDICAL CARE IF:  You have a severe headache.  You have unusual pain in the chest, abdomen, or back.  You are bleeding from the mouth or rectum, or you have black or tarry stool.  You have an irregular or very fast heartbeat.  You have pain with breathing.  You have repeated fainting or seizure-like jerking during an episode.  You faint when sitting or lying down.  You have confusion.  You have difficulty walking.  You have severe weakness.  You have vision problems.  If you fainted, call your local emergency services. Do not drive yourself to the hospital    Diagnosis: Orthostatic hypotension  Assessment and Plan of Treatment: Improved  You received IV fluids  Drink plenty of fluids to stay well hydrated.    Diagnosis: Cerebellar stroke, acute  Assessment and Plan of Treatment: You were found to have an acute infarct on MRI  Neurology was consulted; follow up outpatient for an EEG  EP was consulted and you have an ILR placed on 9/22

## 2023-09-22 NOTE — DISCHARGE NOTE NURSING/CASE MANAGEMENT/SOCIAL WORK - PATIENT PORTAL LINK FT
You can access the FollowMyHealth Patient Portal offered by [facility] by registering at the following website: [email or web address]. By joining [facility]’s FollowMyHealth portal, you will also be able to view your health information using other applications (apps) compatible with our system.

## 2023-09-22 NOTE — DISCHARGE NOTE PROVIDER - NSDCACTIVITY_GEN_ALL_CORE
Bed: B-10  Expected date:   Expected time:   Means of arrival: [facility]  Comments:  17F Seizure     Denise Sanderson RN  06/12/19 1391 No restrictions

## 2023-09-22 NOTE — PROGRESS NOTE ADULT - ASSESSMENT
73 year old male with pre-DM Presents to the ED as a transfer from  [facility] for subarachnoid hemorrhage after patient had a fall.  Patient transferred to be  seen by neurosurgery.  Patient having no acute complaints right now other than the laceration on his head.  Denying any dizziness, nausea or vomiting.  Pt has had multiple episodes of syncope over the past few years.  feels lightheaded prior to syncope.  He coud be in a sitting position.  Denies tongue biting or urinary incontinence.   CTH L frontal SDAH and contusion.  small right frontal hemorrahge.   CT C spine neg   repeat CTH uhcnaged L frontal hemorrhagic contusion. 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  SAH from prior   MRA H/N Neg   b12 397   TTE EF 60%     Impression:   1) Syncope/fall, states he has several episodes a year for many years   2) trauautic SAH  3) cerebellar hypodensity on R likely nonhemorrhagic contusion vs stroke   4) LE distal numbness likely neuyropahty   5) emoblic apearing R cerebellar and L frontal infarct, ESUS        - ASA 81mg when cleared by neurosx  - high dose statin, atorvastatin 80mg QHS  - keppra x 7 days per nsx for seizure ppx   - check orthostatics   - cardiac eval; recs appreicated   - rEEG    - Hemoglobin A1c and lipid panel  - telemetry; would benefit from extended cardiac monitoring   - can try gabapentin 300 QHS  - emg lowers outpatient   - check b12 --> low. would supplement   - PT/OT  - check FS, glucose control <180  - GI/DVT ppx   - Thank you for allowing me to participate in the care of this patient. Call with questions.   spoke to patient extenseively on 9/22 along with cardio and EP. patient expressed understanding and wants to proceed with TEETEE Vergara MD  Vascular Neurology  Office: [phone]

## 2023-09-22 NOTE — PROGRESS NOTE ADULT - SUBJECTIVE AND OBJECTIVE BOX
DATE OF SERVICE: 09-20-23 @ 13:33    Patient is a 73y old  Male who presents with a chief complaint of syncope (20 Sep 2023 08:49)      SUBJECTIVE / OVERNIGHT EVENTS:  No chest pain. No shortness of breath. No complaints. No events overnight.     MEDICATIONS  (STANDING):  levETIRAcetam 500 milliGRAM(s) Oral two times a day  sodium chloride 0.9%. 1000 milliLiter(s) (100 mL/Hr) IV Continuous <Continuous>    MEDICATIONS  (PRN):      Vital Signs Last 24 Hrs  T(C): 36.7 (20 Sep 2023 11:02), Max: 36.7 (19 Sep 2023 21:23)  T(F): 98.1 (20 Sep 2023 11:02), Max: 98.1 (19 Sep 2023 21:23)  HR: 82 (20 Sep 2023 11:02) (60 - 83)  BP: 146/83 (20 Sep 2023 11:02) (122/71 - 146/83)  BP(mean): --  RR: 18 (20 Sep 2023 11:02) (18 - 18)  SpO2: 95% (20 Sep 2023 11:02) (93% - 97%)    Parameters below as of 20 Sep 2023 11:02  Patient On (Oxygen Delivery Method): room air      CAPILLARY BLOOD GLUCOSE        I&O's Summary      PHYSICAL EXAM:  GENERAL: NAD, well-developed  HEAD:  Atraumatic, Normocephalic  EYES: EOMI, PERRLA, conjunctiva and sclera clear  NECK: Supple, No JVD  CHEST/LUNG: Clear to auscultation bilaterally; No wheeze  HEART: Regular rate and rhythm; No murmurs, rubs, or gallops  ABDOMEN: Soft, Nontender, Nondistended; Bowel sounds present  EXTREMITIES:  2+ Peripheral Pulses, No clubbing, cyanosis, or edema  PSYCH: AAOx3  NEUROLOGY: non-focal  SKIN: No rashes or lesions    LABS:                        15.3   6.52  )-----------( 213      ( 20 Sep 2023 07:10 )             45.9     09-20    141  |  106  |  15  ----------------------------<  118<H>  4.3   |  24  |  0.98    Ca    9.1      20 Sep 2023 07:10  Phos  2.8     09-20  Mg     2.2     09-20    TPro  6.9  /  Alb  4.2  /  TBili  0.6  /  DBili  x   /  AST  20  /  ALT  20  /  AlkPhos  91  09-19    PT/INR - ( 19 Sep 2023 02:58 )   PT: 11.3 sec;   INR: 1.08 ratio         PTT - ( 19 Sep 2023 02:58 )  PTT:30.1 sec      Urinalysis Basic - ( 20 Sep 2023 07:10 )    Color: x / Appearance: x / SG: x / pH: x  Gluc: 118 mg/dL / Ketone: x  / Bili: x / Urobili: x   Blood: x / Protein: x / Nitrite: x   Leuk Esterase: x / RBC: x / WBC x   Sq Epi: x / Non Sq Epi: x / Bacteria: x    --tte    RADIOLOGY & ADDITIONAL TESTS:    Imaging Personally Reviewed:    Consultant(s) Notes Reviewed:      Care Discussed with Consultants/Other Providers:  
Subjective: Patient seen and examined. No new events except as noted.     REVIEW OF SYSTEMS:    CONSTITUTIONAL: No weakness, fevers or chills  EYES/ENT: No visual changes;  No vertigo or throat pain   NECK: No pain or stiffness  RESPIRATORY: No cough, wheezing, hemoptysis; No shortness of breath  CARDIOVASCULAR: No chest pain or palpitations  GASTROINTESTINAL: No abdominal or epigastric pain. No nausea, vomiting, or hematemesis; No diarrhea or constipation. No melena or hematochezia.  GENITOURINARY: No dysuria, frequency or hematuria  NEUROLOGICAL: No numbness or weakness  SKIN: No itching, burning, rashes, or lesions   All other review of systems is negative unless indicated above.    MEDICATIONS:  MEDICATIONS  (STANDING):  levETIRAcetam 500 milliGRAM(s) Oral two times a day      PHYSICAL EXAM:  T(C): 36.7 (09-22-23 @ 09:00), Max: 36.9 (09-21-23 @ 21:06)  HR: 54 (09-22-23 @ 09:00) (52 - 58)  BP: 117/71 (09-22-23 @ 09:00) (117/71 - 137/74)  RR: 18 (09-22-23 @ 09:00) (18 - 18)  SpO2: 93% (09-22-23 @ 09:00) (92% - 96%)  Wt(kg): --  I&O's Summary    21 Sep 2023 07:01  -  22 Sep 2023 07:00  --------------------------------------------------------  IN: 900 mL / OUT: 0 mL / NET: 900 mL          Appearance: Normal	  HEENT:   Normal oral mucosa, PERRL, EOMI	  Lymphatic: No lymphadenopathy , no edema  Cardiovascular: Normal S1 S2, No JVD, No murmurs , Peripheral pulses palpable 2+ bilaterally  Respiratory: Lungs clear to auscultation, normal effort 	  Gastrointestinal:  Soft, Non-tender, + BS	  Skin: No rashes, No ecchymoses, No cyanosis, warm to touch  Musculoskeletal: Normal range of motion, normal strength  Psychiatry:  Mood & affect appropriate  Ext: No edema  Neurological Exam:  Mental Status: Awake, alert and oriented x 3.  Able to follow simple and complex verbal commands. Able to name and repeat. fluent speech. No obvious aphasia or dysarthria noted.   Cranial Nerves: PERRL, EOMI, VFFC, sensation V1-V3 intact,  no obvious facial asymmetry , equal elevation of palate, scm/trap 5/5, tongue is midline on protrusion. no obvious papilledema on fundoscopic exam. Hearing is grossly intact.   Motor: Normal bulk, tone and strength throughout. Fine finger movements were intact and symmetric. no tremors or drift noted.    Sensation: Intact to light touch and pinprick throughout. no right/left confusion. no extinction to tactile on DSS. Romberg was negative.   Reflexes: 1+ throughout at biceps, brachioradialis, triceps, patellars and ankles bilaterally and equal. No clonus. R toe and L toe were both downgoing.  Coordination: No dysmetria on FNF or HKS  Gait: Narrow based and steady. Able to tandem. no limitations in gait.       LABS:    CARDIAC MARKERS:                                15.6   5.69  )-----------( 208      ( 22 Sep 2023 06:48 )             46.0     09-22    139  |  103  |  21  ----------------------------<  133<H>  4.0   |  21<L>  |  0.94    Ca    9.1      22 Sep 2023 06:58  Phos  3.1     09-22  Mg     2.0     09-22      proBNP:   Lipid Profile:   HgA1c:   TSH:             TELEMETRY: 	  SR  ECG:  	  RADIOLOGY:   DIAGNOSTIC TESTING:  [ ] Echocardiogram:  [ ]  Catheterization:  [ ] Stress Test:    OTHER: 	          
DATE OF SERVICE: 09-21-23 @ 13:30    Patient is a 73y old  Male who presents with a chief complaint of syncope (21 Sep 2023 09:48)      SUBJECTIVE / OVERNIGHT EVENTS:  No chest pain. No shortness of breath. No complaints. No events overnight.     MEDICATIONS  (STANDING):  levETIRAcetam 500 milliGRAM(s) Oral two times a day    MEDICATIONS  (PRN):      Vital Signs Last 24 Hrs  T(C): 36.8 (21 Sep 2023 11:35), Max: 37.1 (20 Sep 2023 21:35)  T(F): 98.2 (21 Sep 2023 11:35), Max: 98.7 (20 Sep 2023 21:35)  HR: 58 (21 Sep 2023 11:35) (56 - 118)  BP: 124/75 (21 Sep 2023 11:35) (123/76 - 147/82)  BP(mean): --  RR: 18 (21 Sep 2023 11:35) (18 - 18)  SpO2: 95% (21 Sep 2023 11:35) (93% - 97%)    Parameters below as of 21 Sep 2023 11:35  Patient On (Oxygen Delivery Method): room air      CAPILLARY BLOOD GLUCOSE        I&O's Summary    21 Sep 2023 07:01  -  21 Sep 2023 13:30  --------------------------------------------------------  IN: 480 mL / OUT: 0 mL / NET: 480 mL        PHYSICAL EXAM:  GENERAL: NAD, well-developed  HEAD:  Atraumatic, Normocephalic  EYES: EOMI, PERRLA, conjunctiva and sclera clear  NECK: Supple, No JVD  CHEST/LUNG: Clear to auscultation bilaterally; No wheeze  HEART: Regular rate and rhythm; No murmurs, rubs, or gallops  ABDOMEN: Soft, Nontender, Nondistended; Bowel sounds present  EXTREMITIES:  2+ Peripheral Pulses, No clubbing, cyanosis, or edema  PSYCH: AAOx3  NEUROLOGY: non-focal  SKIN: No rashes or lesions    LABS:                        15.3   6.52  )-----------( 213      ( 20 Sep 2023 07:10 )             45.9     09-20    141  |  106  |  15  ----------------------------<  118<H>  4.3   |  24  |  0.98    Ca    9.1      20 Sep 2023 07:10  Phos  2.8     09-20  Mg     2.2     09-20            Urinalysis Basic - ( 20 Sep 2023 07:10 )    Color: x / Appearance: x / SG: x / pH: x  Gluc: 118 mg/dL / Ketone: x  / Bili: x / Urobili: x   Blood: x / Protein: x / Nitrite: x   Leuk Esterase: x / RBC: x / WBC x   Sq Epi: x / Non Sq Epi: x / Bacteria: x    < from: TTE W or WO Ultrasound Enhancing Agent (09.20.23 @ 13:40) >      1. Leftventricular systolic function is normal with an ejection fraction of 60 % by Camargo's method of disks.   2. There is normal left ventricular diastolic function.   3. Right ventricular cavity is normal in size and normal systolic function.   4. Mild aortic regurgitation.   5. No prior echocardiogram is available for comparison.      < end of copied text >  < from: MR Angio Neck No Cont (09.20.23 @ 12:48) >    FINDINGS:  There is a small to moderate area of abnormal restricted diffusion in the   right cerebellum on image 42 of series 5 compatible to acute infarction.   Additional small infarct in the left frontal lobe on image 53 of series   5. Stable left frontal lobe hemorrhagic contusion moderate adjacent edema   on image 17 of series 11. Trace intraventricular hemorrhage in the left   occipital horn. The patient's known trace subarachnoid hemorrhage in the   right frontal lobe and left parietal lobe are better seen on the recent   head CT. Nondisplaced right parietal occipital calvarial fracture.    Scattered periventricular and subcortical white  matter T2 /FLAIR hyperintensities are seen without mass effect,   nonspecific, likely representing mild chronic microvascular changes.   Normal T2 flow-voids are seen within  the intracranial vasculature. The   lateral ventricles and cortical sulci are age-appropriate in size and   configuration  midline structures are normal. 1.2 cm mucous retention cyst in the right   maxillary sinus. The visualized paranasal sinuses, mastoid air cells and   orbits are unremarkable.          ======================      CLINICAL INDICATIONS:r/o stroke    TECHNIQUE: MRA brain. 3-D time of flight imaging with 2D MIP   reconstructions.    COMPARISON: None    FINDINGS:  Early termination of the right vertebral artery. Left vertebral artery is   dominant.  The [place] and vertebrobasilar system are otherwise   unremarkable without evidence of stenosis, occlusion or saccular aneurysm   dilation. No evidence for arterial venous malformation.        =====================      CLINICAL INDICATIONS: r/o stroke    TECHNIQUE: Contrast-enhanced MRA neck: 3-D time of flight imaging with 2D   MIP reconstructions.10 cc Intravenous Gadavist was administered.    COMPARISON: None    FINDINGS:  A left-sided aortic arch is demonstrated. There is normal relationship to   the great vessels. The common carotid arteries, internal carotid arteries   and vertebral arteries are normal in caliber. No evidence of stenosis,   occlusion or saccular aneurysm dilation. The left vertebral artery is   dominant.    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   Stable left frontal lobe hemorrhagic contusion with moderate adjacent   edema. Trace intraventricular hemorrhage in the left occipital horn. The   patient's known trace subarachnoid hemorrhage in the right frontal lobe   and left parietal lobe are better seen on the recent head CT.   Nondisplaced right parietal occipital calvarial fracture.      MRA BRAIN: No large vessel occlusion.    MRA NECK: No significant stenosis or large vessel occlusion.    --- End of Report ---      < end of copied text >      RADIOLOGY & ADDITIONAL TESTS:    Imaging Personally Reviewed:    Consultant(s) Notes Reviewed:      Care Discussed with Consultants/Other Providers:  
DATE OF SERVICE: 09-22-23 @ 13:40    Patient is a 73y old  Male who presents with a chief complaint of syncope (22 Sep 2023 12:45)      SUBJECTIVE / OVERNIGHT EVENTS:  No chest pain. No shortness of breath. No complaints. No events overnight.     MEDICATIONS  (STANDING):  levETIRAcetam 500 milliGRAM(s) Oral two times a day    MEDICATIONS  (PRN):      Vital Signs Last 24 Hrs  T(C): 36.7 (22 Sep 2023 09:00), Max: 36.9 (21 Sep 2023 21:06)  T(F): 98.1 (22 Sep 2023 09:00), Max: 98.4 (21 Sep 2023 21:06)  HR: 54 (22 Sep 2023 09:00) (52 - 56)  BP: 117/71 (22 Sep 2023 09:00) (117/71 - 137/74)  BP(mean): --  RR: 18 (22 Sep 2023 09:00) (18 - 18)  SpO2: 93% (22 Sep 2023 09:00) (92% - 96%)    Parameters below as of 22 Sep 2023 09:00  Patient On (Oxygen Delivery Method): room air      CAPILLARY BLOOD GLUCOSE        I&O's Summary    21 Sep 2023 07:01  -  22 Sep 2023 07:00  --------------------------------------------------------  IN: 900 mL / OUT: 0 mL / NET: 900 mL    22 Sep 2023 07:01  -  22 Sep 2023 13:40  --------------------------------------------------------  IN: 360 mL / OUT: 0 mL / NET: 360 mL        PHYSICAL EXAM:  GENERAL: NAD, well-developed  HEAD:  Atraumatic, Normocephalic  EYES: EOMI, PERRLA, conjunctiva and sclera clear  NECK: Supple, No JVD  CHEST/LUNG: Clear to auscultation bilaterally; No wheeze  HEART: Regular rate and rhythm; No murmurs, rubs, or gallops  ABDOMEN: Soft, Nontender, Nondistended; Bowel sounds present  EXTREMITIES:  2+ Peripheral Pulses, No clubbing, cyanosis, or edema  PSYCH: AAOx3  NEUROLOGY: non-focal  SKIN: No rashes or lesions    LABS:                        15.6   5.69  )-----------( 208      ( 22 Sep 2023 06:48 )             46.0     09-22    139  |  103  |  21  ----------------------------<  133<H>  4.0   |  21<L>  |  0.94    Ca    9.1      22 Sep 2023 06:58  Phos  3.1     09-22  Mg     2.0     09-22            Urinalysis Basic - ( 22 Sep 2023 06:58 )    Color: x / Appearance: x / SG: x / pH: x  Gluc: 133 mg/dL / Ketone: x  / Bili: x / Urobili: x   Blood: x / Protein: x / Nitrite: x   Leuk Esterase: x / RBC: x / WBC x   Sq Epi: x / Non Sq Epi: x / Bacteria: x        RADIOLOGY & ADDITIONAL TESTS:    Imaging Personally Reviewed:    Consultant(s) Notes Reviewed:      Care Discussed with Consultants/Other Providers:  
EP Attending    HISTORY OF PRESENT ILLNESS: HPI:  73 year old male PMH pre-DM Presents to the ED as a transfer from  [facility] for subarachnoid hemorrhage after patient had a fall.  Patient transferred to be seen by neurosurgery.  Patient having no acute complaints right now other than the laceration on his head.  Denying any dizziness, nausea or vomiting.  Pt has had multiple episodes of syncope over the past few years.  feels lightheaded prior to syncope.  He coud be in a sitting position.  Denies tongue biting or urinary incontinence. (19 Sep 2023 17:11)  MRI with multiple acute CVAs.  He reports mild headache, otherwise comfortable.  No prior cardiac work up.  Date of service 9/22- patient understands that his longstanding history of syncope (x25yrs) may be true and unrelated to his new strokes.  He is willing to undergo ILR insertion for long term AFib surveillance after embolic stroke of unknown source.  He has no history of CHF, diabetes, or blood clots.    PAST MEDICAL & SURGICAL HISTORY:  No pertinent past medical history    levETIRAcetam 500 milliGRAM(s) Oral two times a day                            15.6   5.69  )-----------( 208      ( 22 Sep 2023 06:48 )             46.0       09-22    139  |  103  |  21  ----------------------------<  133<H>  4.0   |  21<L>  |  0.94    Ca    9.1      22 Sep 2023 06:58  Phos  3.1     09-22  Mg     2.0     09-22        T(C): 36.7 (09-22-23 @ 09:00), Max: 36.9 (09-21-23 @ 21:06)  HR: 54 (09-22-23 @ 09:00) (52 - 58)  BP: 117/71 (09-22-23 @ 09:00) (117/71 - 137/74)  RR: 18 (09-22-23 @ 09:00) (18 - 18)  SpO2: 93% (09-22-23 @ 09:00) (92% - 96%)  Wt(kg): --    I&O's Summary    21 Sep 2023 07:01  -  22 Sep 2023 07:00  --------------------------------------------------------  IN: 900 mL / OUT: 0 mL / NET: 900 mL        Gen: Appears well in NAD  HEENT:  (-)icterus (-)pallor  CV: N S1 S2 1/6 MICHAEL (+)2 Pulses B/l  Resp:  Clear to ausculatation B/L, normal effort  GI: (+) BS Soft, NT, ND  Lymph:  (-)Edema, (-)obvious lymphadenopathy  Skin: Warm to touch, Normal turgor  Psych: Appropriate mood and affect      TELEMETRY: 	SR      ECG:  	NSR    < from: MR Angio Neck No Cont (09.20.23 @ 12:48) >  IMPRESSION:    MRI BRAIN: Acute right cerebellum and left frontal lobe infarctions.   Stable left frontal lobe hemorrhagic contusion with moderate adjacent   edema. Trace intraventricular hemorrhage in the left occipital horn. The   patient's known trace subarachnoid hemorrhage in the right frontal lobe   and left parietal lobe are better seen on the recent head CT.   Nondisplaced right parietal occipital calvarial fracture.      MRA BRAIN: No large vessel occlusion.    MRA NECK: No significant stenosis or large vessel occlusion.    < end of copied text >    < from: TTE W or WO Ultrasound Enhancing Agent (09.20.23 @ 13:40) >  CONCLUSIONS:      1. Leftventricular systolic function is normal with an ejection fraction of 60 % by Camargo's method of disks.   2. There is normal left ventricular diastolic function.   3. Right ventricular cavity is normal in size and normal systolic function.   4. Mild aortic regurgitation.   5. No prior echocardiogram is available for comparison.    < end of copied text >      ASSESSMENT/PLAN: 	73 year old male PMH pre-DM s/p syncope with SAH and 2 acute CVAs on MRI.  EP consulted for syncope and ILR eval  He has no prior history of HTN, diabetes mellitus or prior TIA.  He has many years of syncopal episodes, that are likely vasovagal.  His echocardiogram is reassuring, normal EF, no patent foramen ovale.  Normal head and neck vasculature.  Neurology note suggests that his infarcts may be embolic due to the size/location, and contributory arrhythmias should be ruled out.  He is referred for a loop recorder specifically for surveillance for paroxysmal atrial fibrillation after an embolic stroke of unknown source.  A repeat stroke from AFib has a high likelihood of being disabling or fatal, and identifying this arrhythmia would change medical management by allowing us to start oral anticoagulation (which he is willing to take).  There is less than 1% risk of infection for this bedside minor surgery.  He prefers this pathway over an event monitor, which would require daily wear for ~30 days at a time.  We will return to bedside later to proceed with implant, see separate operative report.  Wound check / data mgmt with Dr Valencia.  Obtain clearance from neurosurgery on when OK to start aspirin 81mg daily.   Start atorvastatin 80mg daily for secondary stroke prevention.    Ezequiel Osborne M.D.  Cardiac Electrophysiology  [phone]    
Neurology Progress Note    S: Patient seen and examined. No new events overnight. patient denied CP, SOB, HA or pain.     Medication:    Medications: MEDICATIONS  (STANDING):  levETIRAcetam 500 milliGRAM(s) Oral two times a day    MEDICATIONS  (PRN):       Vitals:  Vital Signs Last 24 Hrs  T(C): 36.7 (22 Sep 2023 09:00), Max: 36.9 (21 Sep 2023 21:06)  T(F): 98.1 (22 Sep 2023 09:00), Max: 98.4 (21 Sep 2023 21:06)  HR: 54 (22 Sep 2023 09:00) (52 - 58)  BP: 117/71 (22 Sep 2023 09:00) (117/71 - 137/74)  BP(mean): --  RR: 18 (22 Sep 2023 09:00) (18 - 18)  SpO2: 93% (22 Sep 2023 09:00) (92% - 96%)    Parameters below as of 22 Sep 2023 09:00  Patient On (Oxygen Delivery Method): room air              General Exam:   General Appearance: Appropriately dressed and in no acute distress       Head: Normocephalic, atraumatic and no dysmorphic features  Ear, Nose, and Throat: Moist mucous membranes  CVS: S1S2+  Resp: No SOB, no wheeze or rhonchi  Abd: soft NTND  Extremities: No edema, no cyanosis  Skin: No bruises, no rashes     Neurological Exam:  Mental Status: Awake, alert and oriented x 3.  Able to follow simple and complex verbal commands. Able to name and repeat. fluent speech. No obvious aphasia or dysarthria noted.   Cranial Nerves: PERRL, EOMI, VFFC, sensation V1-V3 intact,  no obvious facial asymmetry , equal elevation of palate, scm/trap 5/5, tongue is midline on protrusion. no obvious papilledema on fundoscopic exam. Hearing is grossly intact.   Motor: Normal bulk, tone and strength throughout. Fine finger movements were intact and symmetric. no tremors or drift noted.    Sensation: Intact to light touch and pinprick throughout. no right/left confusion. no extinction to tactile on DSS. Romberg was negative.   Reflexes: 1+ throughout at biceps, brachioradialis, triceps, patellars and ankles bilaterally and equal. No clonus. R toe and L toe were both downgoing.  Coordination: No dysmetria on FNF or HKS  Gait: Narrow based and steady. Able to tandem. no limitations in gait.     I personally reviewed the below data/images/labs:      LABS:                          15.6   5.69  )-----------( 208      ( 22 Sep 2023 06:48 )             46.0     09-22    139  |  103  |  21  ----------------------------<  133<H>  4.0   |  21<L>  |  0.94    Ca    9.1      22 Sep 2023 06:58  Phos  3.1     09-22  Mg     2.0     09-22          Urinalysis Basic - ( 22 Sep 2023 06:58 )    Color: x / Appearance: x / SG: x / pH: x  Gluc: 133 mg/dL / Ketone: x  / Bili: x / Urobili: x   Blood: x / Protein: x / Nitrite: x   Leuk Esterase: x / RBC: x / WBC x   Sq Epi: x / Non Sq Epi: x / Bacteria: x            < from: CT Head No Cont (09.18.23 @ 21:39) >    ACC: [id number] EXAM:  CT CERVICAL SPINE   ORDERED BY: JOSE RODRIGUEZ     ACC: [id number] EXAM:  CT BRAIN   ORDERED BY: JOSE RODRIGUEZ     PROCEDURE DATE:  09/18/2023          INTERPRETATION:  CLINICAL INFORMATION: Syncope and fall.    COMPARISON: None.    PROCEDURE:  Noncontrast CT of the head and cervical spine. Coronal and Sagittal   reformats were obtained.    FINDINGS:    CT head:    Left anterior inferior frontal lobe subarachnoid hemorrhage and   additional tiny rounded foci of hyperdensity, which may reflect tiny   contusions. Tiny hyperdensities within the anterior inferior right   frontal lobe may represent additional foci of hemorrhage versus artifact.   No significant mass effect, midline shift. Tiny right parafalcine   extra-axial hemorrhage (2:30).    The visualized paranasal sinuses and mastoid air cells are clear. Right   parietal scalp soft tissue swelling. Nondisplaced right occipital   calvarial fracture.    CT cervical spine:    No acute cervical spine fracture or evidence of traumatic malalignment.   Nonspecific straightening of the normal cervical lordosis. Craniocervical   junction is unremarkable. No facet joint dislocation. Fusion of the right   C2-C3 facets. No prevertebral soft tissue swelling.    There are multilevel degenerative change of the spine characterized by   degenerative disc disease as well as uncovertebral and facet joint   arthrosis, contributing to varying degrees of mild neuroforaminal and   spinal canal stenoses.    Visualized lung apices are clear.    IMPRESSION:    CT Head: Left anterior inferior frontal lobe subarachnoid hemorrhage and   likely small hemorrhagic parenchymal contusions. Possible tiny foci of   right anterior inferior frontal lobe hemorrhage, however evaluation is   limited by adjacent bone. Tiny focus of right interhemispheric   extra-axial hemorrhage.    Nondisplaced right occipital calvarial fracture with overlying scalp   hematoma.    CT cervical spine: No acute cervical spine fracture or evidence of   traumatic malalignment.    Dr. Love discussed the above findings with Dr. Rodriguez at 10:13 PM on   9/18/2023 with read back.    --- End of Report ---            DEANNE LOVE MD; Attending Radiologist  This document has been electronically signed. Sep 18 2023 10:16PM    < end of copied text >      < from: CT Head No Cont (09.19.23 @ 10:15) >    ACC: [id number] EXAM:  CT BRAIN   ORDERED BY: TARI SKINNER     PROCEDURE DATE:  09/19/2023          INTERPRETATION:  Noncontrast CT of the brain.    CLINICAL INDICATION:  Evaluate for subdural hematoma    TECHNIQUE : Axial CT scanning of the brain was obtained from the skull   base to the vertex without the administration of intravenous contrast.    COMPARISON: Brain CT dated 9/19/2023 at 4:26 AM    FINDINGS:  Redemonstrated left frontal hemorrhagic contusion, unchanged   in appearance.    Redemonstrated is a trace right inferior and left parietal subarachnoid   hemorrhage, both slightly decreased compared with the prior examination.    < from: MR Angio Neck No Cont (09.20.23 @ 12:48) >    ACC: [id number] EXAM:  MR ANGIO BRAIN   ORDERED BY: SOHA KINNEY     ACC: [id number] EXAM:  MR ANGIO NECK   ORDERED BY: SOHA KINNEY     ACC: [id number] EXAM:  MR BRAIN   ORDERED BY: SOHA KINNEY     PROCEDURE DATE:  09/20/2023          INTERPRETATION:  CLINICAL INDICATIONS: r/o stroke    COMPARISON: Head CT dated 9/9 6 2023    TECHNIQUE: MRI brain: Multiplanar, multisequence MR imaging of the brain   are obtained without the administration of intravenous Gadavist contrast.    FINDINGS:  There is a small to moderate area of abnormal restricted diffusion in the   right cerebellum on image 42 of series 5 compatible to acute infarction.   Additional small infarct in the left frontal lobe on image 53 of series   5. Stable left frontal lobe hemorrhagic contusion moderate adjacent edema   on image 17 of series 11. Trace intraventricular hemorrhage in the left   occipital horn. The patient's known trace subarachnoid hemorrhage in the   right frontal lobe and left parietal lobe are better seen on the recent   head CT. Nondisplaced right parietal occipital calvarial fracture.    Scattered periventricular and subcortical white  matter T2 /FLAIR hyperintensities are seen without mass effect,   nonspecific, likely representing mild chronic microvascular changes.   Normal T2 flow-voids are seen within  the intracranial vasculature. The   lateral ventricles and cortical sulci are age-appropriate in size and   configuration  midline structures are normal. 1.2 cm mucous retention cyst in the right   maxillary sinus. The visualized paranasal sinuses, mastoid air cells and   orbits are unremarkable.          ======================      CLINICAL INDICATIONS:r/o stroke    TECHNIQUE: MRA brain. 3-D time of flight imaging with 2D MIP   reconstructions.    COMPARISON: None    FINDINGS:  Early termination of the right vertebral artery. Left vertebral artery is   dominant.  The [place] and vertebrobasilar system are otherwise   unremarkable without evidence of stenosis, occlusion or saccular aneurysm   dilation. No evidence for arterial venous malformation.        =====================      CLINICAL INDICATIONS: r/o stroke    TECHNIQUE: Contrast-enhanced MRA neck: 3-D time of flight imaging with 2D   MIP reconstructions.10 cc Intravenous Gadavist was administered.    COMPARISON: None    FINDINGS:  A left-sided aortic arch is demonstrated. There is normal relationship to   the great vessels. The common carotid arteries, internal carotid arteries   and vertebral arteries are normal in caliber. No evidence of stenosis,   occlusion or saccular aneurysm dilation. The left vertebral artery is   dominant.    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   Stable left frontal lobe hemorrhagic contusion with moderate adjacent   edema. Trace intraventricular hemorrhage in the left occipital horn. The   patient's known trace subarachnoid hemorrhage in the right frontal lobe   and left parietal lobe are better seen on the recent head CT.   Nondisplaced right parietal occipital calvarial fracture.      MRA BRAIN: No large vessel occlusion.    MRA NECK: No significant stenosis or large vessel occlusion.    --- End of Report ---            SARA FARR MD; Attending Radiologist  This document has been electronically signed. Sep 20 2023  2:04PM    < end of copied text >  Redemonstrated nondisplaced right occipital fracture with overlying scalp   hematoma. Unchanged wedge-shaped lucency within the right cerebellar   hemisphere at the level of the fracture may represent nonhemorrhagic   contusion and/or small infarct.    Unchanged linear density in the right anterior parafalcine subarachnoid   space may represent trace subarachnoid hemorrhage versus vessel.    IMPRESSION:    Unchanged left frontal hemorrhagic contusion.  Decreasing right frontal and left parietal subarachnoid hemorrhage.  Unchanged wedge-shaped right cerebellar lucency at the level of the   occipital bone fracture may represent a nonhemorrhagic contusion versus   infarct.  Nondisplaced right occipital fracture with overlying scalp hematoma.    --- End of Report ---            ANGELINA MAGANA MD; Attending Radiologist  This document has been electronicallysigned. Sep 19 2023 10:27AM    < end of copied text >      < from: TTE W or WO Ultrasound Enhancing Agent (09.20.23 @ 13:40) >    TRANSTHORACIC ECHOCARDIOGRAM REPORT  ________________________________________________________________________________                                      _______       Pt. Name:       RAMANDEEP ADKINS  Study Date:    9/20/2023  MRN:            [id number]   YOB: 1950  Accession #:    [id number]    Age:           73 years  Account#:       [id number] Gender:        M  Heart Rate:                  Height:        75.00 in (190.50 cm)  Rhythm:                      Weight:        198.00 lb(89.81 kg)  Blood Pressure: 132/79 mmHg  BSA/BMI:       2.19 m² / 24.75 kg/m²  ________________________________________________________________________________________  Referring Physician:    [id number] Monika Le  Interpreting Physician: Carlos RUCKER  Primary Sonographer:    Rio Pina FABY    CPT:               ECHO TTE WO CON COMP W DOPP - 07525.m  Indication(s):     Syncope and collapse - R55  Procedure:         Transthoracic echocardiogram with 2-D, M-mode and complete                spectral and color flow Doppler.  Ordering Location: [facility]  Study Information: Image quality for this study is fair.    _______________________________________________________________________________________     CONCLUSIONS:      1. Leftventricular systolic function is normal with an ejection fraction of 60 % by Camargo's method of disks.   2. There is normal left ventricular diastolic function.   3. Right ventricular cavity is normal in size and normal systolic function.   4. Mild aortic regurgitation.   5. No prior echocardiogram is available for comparison.    ________________________________________________________________________________________  FINDINGS:     Left Ventricle:  Left ventricular systolic function is normal with a calculated ejection fraction of 60 % by the Camargo's biplane method of disks. There is normal left ventricular diastolic function.     Right Ventricle:  The right ventricular cavity is normal in size and normal systolic function. Tricuspid annular plane systolic excursion (TAPSE) is 2.4 cm (normal >=1.7 cm). Tricuspid annular tissue Doppler S' is 8.8 cm/s (normal >10 cm/s).     Left Atrium:  The left atrium is normal in size with an indexed volume of 29.66 ml/m².     Right Atrium:  The rightatrium is normal in size with an indexed volume of 16.02 ml/m².     Interatrial Septum:  The interatrial septum appears intact.     Aortic Valve:  The aortic valve appears trileaflet. There is fibrocalcific aortic valve sclerosis without stenosis. There is mild aortic regurgitation.     Mitral Valve:  Structurally normal mitral valve with normal leaflet excursion. There is mild mitral regurgitation.     Tricuspid Valve:  Structurally normal tricuspid valve with normal leaflet excursion. There is mild tricuspid regurgitation. Estimated pulmonary artery systolic pressure is 23 mmHg.     Pulmonic Valve:  Structurally normal pulmonic valve with normal leaflet excursion. There is trace pulmonic regurgitation.     Systemic Veins:  The inferior venacava is normal in size measuring 1.90 cm in diameter, (normal <2.1cm) with normal inspiratory collapse (normal >50%) consistent with normal right atrial pressure (~3, range 0-5mmHg).  ____________________________________________________________________  Quantitative Data:  Left Ventricle Measurements: (Indexed to BSA)     IVSd (2D):   0.8 cm  LVPWd (2D):  0.7 cm  LVIDd (2D):  5.3 cm  LVIDs (2D):  3.2 cm  LV Mass:     135 g  61.7 g/m²  BiPlane LV EF%: 60 %     MV E Vmax:    0.44 m/s  MV A Vmax:0.59 m/s  MV E/A:       0.75  e' lateral:   9.25 cm/s  e' medial:    8.16 cm/s  E/e' lateral: 4.76  E/e' medial:  5.39  E/e' Average: 5.05    Aorta Measurements: (normal range) (Indexed to BSA)     Sinuses of Valsalva: 3.60 cm (3.1 - 3.7 cm)  Ao Asc prox:         3.20 cm       Left Atrium Measurements: (Indexed to BSA)  LA Diam 2D: 4.10 cm    Right Ventricle Measurements: Right Atrial Measurements:     TAPSE:           2.4 cm       RA Vol:       35.00 ml  TV Meghan. S':      8.81 cm/s    RA Vol Index: 16.02 ml/m²  RV Base (RVID1): 4.0 cm  RV Mid (RVID2):  2.7 cm       LVOT / RVOT/ Qp/Qs Data: (Indexed to BSA)  LVOT Diameter: 2.20 cm  LVOT Vmax:     0.86 m/s  LVOT VTI:      17.20 cm  LVOT SV:       65.4 ml  29.92 ml/m²    Mitral Valve Measurements:     MV E Vmax: 0.4 m/s  MV A Vmax: 0.6 m/s  MV E/A:    0.7       Tricuspid Valve Measurements:     TR Vmax:          2.2 m/s  TR Peak Gradient: 20.2 mmHg  RA Pressure:      3 mmHg  PASP:             23 mmHg    ________________________________________________________________________________________  Electronically signed on 9/20/2023 at 2:43:51 PM by Theodore Lindo M.D.         *** Final ***    < end of copied text >     
Neurology Progress Note    S: Patient seen and examined. No new events overnight. patient denied CP, SOB, HA or pain.     Medication:  levETIRAcetam 500 milliGRAM(s) Oral two times a day  sodium chloride 0.9%. 1000 milliLiter(s) IV Continuous <Continuous>      Vitals:  Vital Signs Last 24 Hrs  T(C): 36.8 (21 Sep 2023 05:11), Max: 37.1 (20 Sep 2023 21:35)  T(F): 98.3 (21 Sep 2023 05:11), Max: 98.7 (20 Sep 2023 21:35)  HR: 56 (21 Sep 2023 05:11) (56 - 118)  BP: 126/69 (21 Sep 2023 05:11) (123/76 - 147/82)  BP(mean): --  RR: 18 (21 Sep 2023 05:11) (18 - 18)  SpO2: 95% (21 Sep 2023 05:11) (93% - 97%)    Parameters below as of 21 Sep 2023 05:11  Patient On (Oxygen Delivery Method): room air        General Exam:   General Appearance: Appropriately dressed and in no acute distress       Head: Normocephalic, atraumatic and no dysmorphic features  Ear, Nose, and Throat: Moist mucous membranes  CVS: S1S2+  Resp: No SOB, no wheeze or rhonchi  Abd: soft NTND  Extremities: No edema, no cyanosis  Skin: No bruises, no rashes     Neurological Exam:  Mental Status: Awake, alert and oriented x 3.  Able to follow simple and complex verbal commands. Able to name and repeat. fluent speech. No obvious aphasia or dysarthria noted.   Cranial Nerves: PERRL, EOMI, VFFC, sensation V1-V3 intact,  no obvious facial asymmetry , equal elevation of palate, scm/trap 5/5, tongue is midline on protrusion. no obvious papilledema on fundoscopic exam. Hearing is grossly intact.   Motor: Normal bulk, tone and strength throughout. Fine finger movements were intact and symmetric. no tremors or drift noted.    Sensation: Intact to light touch and pinprick throughout. no right/left confusion. no extinction to tactile on DSS. Romberg was negative.   Reflexes: 1+ throughout at biceps, brachioradialis, triceps, patellars and ankles bilaterally and equal. No clonus. R toe and L toe were both downgoing.  Coordination: No dysmetria on FNF or HKS  Gait: Narrow based and steady. Able to tandem. no limitations in gait.     I personally reviewed the below data/images/labs:      CBC Full  -  ( 20 Sep 2023 07:10 )  WBC Count : 6.52 K/uL  RBC Count : 5.02 M/uL  Hemoglobin : 15.3 g/dL  Hematocrit : 45.9 %  Platelet Count - Automated : 213 K/uL  Mean Cell Volume : 91.4 fl  Mean Cell Hemoglobin : 30.5 pg  Mean Cell Hemoglobin Concentration : 33.3 gm/dL  Auto Neutrophil # : x  Auto Lymphocyte # : x  Auto Monocyte # : x  Auto Eosinophil # : x  Auto Basophil # : x  Auto Neutrophil % : x  Auto Lymphocyte % : x  Auto Monocyte % : x  Auto Eosinophil % : x  Auto Basophil % : x    09-20    141  |  106  |  15  ----------------------------<  118<H>  4.3   |  24  |  0.98    Ca    9.1      20 Sep 2023 07:10  Phos  2.8     09-20  Mg     2.2     09-20          Urinalysis Basic - ( 20 Sep 2023 07:10 )    Color: x / Appearance: x / SG: x / pH: x  Gluc: 118 mg/dL / Ketone: x  / Bili: x / Urobili: x   Blood: x / Protein: x / Nitrite: x   Leuk Esterase: x / RBC: x / WBC x   Sq Epi: x / Non Sq Epi: x / Bacteria: x        < from: CT Head No Cont (09.18.23 @ 21:39) >    ACC: [id number] EXAM:  CT CERVICAL SPINE   ORDERED BY: JOSE RODRIGUEZ     ACC: [id number] EXAM:  CT BRAIN   ORDERED BY: JOSE RODRIGUEZ     PROCEDURE DATE:  09/18/2023          INTERPRETATION:  CLINICAL INFORMATION: Syncope and fall.    COMPARISON: None.    PROCEDURE:  Noncontrast CT of the head and cervical spine. Coronal and Sagittal   reformats were obtained.    FINDINGS:    CT head:    Left anterior inferior frontal lobe subarachnoid hemorrhage and   additional tiny rounded foci of hyperdensity, which may reflect tiny   contusions. Tiny hyperdensities within the anterior inferior right   frontal lobe may represent additional foci of hemorrhage versus artifact.   No significant mass effect, midline shift. Tiny right parafalcine   extra-axial hemorrhage (2:30).    The visualized paranasal sinuses and mastoid air cells are clear. Right   parietal scalp soft tissue swelling. Nondisplaced right occipital   calvarial fracture.    CT cervical spine:    No acute cervical spine fracture or evidence of traumatic malalignment.   Nonspecific straightening of the normal cervical lordosis. Craniocervical   junction is unremarkable. No facet joint dislocation. Fusion of the right   C2-C3 facets. No prevertebral soft tissue swelling.    There are multilevel degenerative change of the spine characterized by   degenerative disc disease as well as uncovertebral and facet joint   arthrosis, contributing to varying degrees of mild neuroforaminal and   spinal canal stenoses.    Visualized lung apices are clear.    IMPRESSION:    CT Head: Left anterior inferior frontal lobe subarachnoid hemorrhage and   likely small hemorrhagic parenchymal contusions. Possible tiny foci of   right anterior inferior frontal lobe hemorrhage, however evaluation is   limited by adjacent bone. Tiny focus of right interhemispheric   extra-axial hemorrhage.    Nondisplaced right occipital calvarial fracture with overlying scalp   hematoma.    CT cervical spine: No acute cervical spine fracture or evidence of   traumatic malalignment.    Dr. Love discussed the above findings with Dr. Rodriguez at 10:13 PM on   9/18/2023 with read back.    --- End of Report ---            DEANNE LOVE MD; Attending Radiologist  This document has been electronically signed. Sep 18 2023 10:16PM    < end of copied text >      < from: CT Head No Cont (09.19.23 @ 10:15) >    ACC: [id number] EXAM:  CT BRAIN   ORDERED BY: TARI SKINNER     PROCEDURE DATE:  09/19/2023          INTERPRETATION:  Noncontrast CT of the brain.    CLINICAL INDICATION:  Evaluate for subdural hematoma    TECHNIQUE : Axial CT scanning of the brain was obtained from the skull   base to the vertex without the administration of intravenous contrast.    COMPARISON: Brain CT dated 9/19/2023 at 4:26 AM    FINDINGS:  Redemonstrated left frontal hemorrhagic contusion, unchanged   in appearance.    Redemonstrated is a trace right inferior and left parietal subarachnoid   hemorrhage, both slightly decreased compared with the prior examination.    < from: MR Angio Neck No Cont (09.20.23 @ 12:48) >    ACC: [id number] EXAM:  MR ANGIO BRAIN   ORDERED BY: SOHA KINNEY     ACC: [id number] EXAM:  MR ANGIO NECK   ORDERED BY: SOHA KINNEY     ACC: [id number] EXAM:  MR BRAIN   ORDERED BY: SOHA KINNEY     PROCEDURE DATE:  09/20/2023          INTERPRETATION:  CLINICAL INDICATIONS: r/o stroke    COMPARISON: Head CT dated 9/9 6 2023    TECHNIQUE: MRI brain: Multiplanar, multisequence MR imaging of the brain   are obtained without the administration of intravenous Gadavist contrast.    FINDINGS:  There is a small to moderate area of abnormal restricted diffusion in the   right cerebellum on image 42 of series 5 compatible to acute infarction.   Additional small infarct in the left frontal lobe on image 53 of series   5. Stable left frontal lobe hemorrhagic contusion moderate adjacent edema   on image 17 of series 11. Trace intraventricular hemorrhage in the left   occipital horn. The patient's known trace subarachnoid hemorrhage in the   right frontal lobe and left parietal lobe are better seen on the recent   head CT. Nondisplaced right parietal occipital calvarial fracture.    Scattered periventricular and subcortical white  matter T2 /FLAIR hyperintensities are seen without mass effect,   nonspecific, likely representing mild chronic microvascular changes.   Normal T2 flow-voids are seen within  the intracranial vasculature. The   lateral ventricles and cortical sulci are age-appropriate in size and   configuration  midline structures are normal. 1.2 cm mucous retention cyst in the right   maxillary sinus. The visualized paranasal sinuses, mastoid air cells and   orbits are unremarkable.          ======================      CLINICAL INDICATIONS:r/o stroke    TECHNIQUE: MRA brain. 3-D time of flight imaging with 2D MIP   reconstructions.    COMPARISON: None    FINDINGS:  Early termination of the right vertebral artery. Left vertebral artery is   dominant.  The [place] and vertebrobasilar system are otherwise   unremarkable without evidence of stenosis, occlusion or saccular aneurysm   dilation. No evidence for arterial venous malformation.        =====================      CLINICAL INDICATIONS: r/o stroke    TECHNIQUE: Contrast-enhanced MRA neck: 3-D time of flight imaging with 2D   MIP reconstructions.10 cc Intravenous Gadavist was administered.    COMPARISON: None    FINDINGS:  A left-sided aortic arch is demonstrated. There is normal relationship to   the great vessels. The common carotid arteries, internal carotid arteries   and vertebral arteries are normal in caliber. No evidence of stenosis,   occlusion or saccular aneurysm dilation. The left vertebral artery is   dominant.    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   Stable left frontal lobe hemorrhagic contusion with moderate adjacent   edema. Trace intraventricular hemorrhage in the left occipital horn. The   patient's known trace subarachnoid hemorrhage in the right frontal lobe   and left parietal lobe are better seen on the recent head CT.   Nondisplaced right parietal occipital calvarial fracture.      MRA BRAIN: No large vessel occlusion.    MRA NECK: No significant stenosis or large vessel occlusion.    --- End of Report ---            SARA FARR MD; Attending Radiologist  This document has been electronically signed. Sep 20 2023  2:04PM    < end of copied text >  Redemonstrated nondisplaced right occipital fracture with overlying scalp   hematoma. Unchanged wedge-shaped lucency within the right cerebellar   hemisphere at the level of the fracture may represent nonhemorrhagic   contusion and/or small infarct.    Unchanged linear density in the right anterior parafalcine subarachnoid   space may represent trace subarachnoid hemorrhage versus vessel.    IMPRESSION:    Unchanged left frontal hemorrhagic contusion.  Decreasing right frontal and left parietal subarachnoid hemorrhage.  Unchanged wedge-shaped right cerebellar lucency at the level of the   occipital bone fracture may represent a nonhemorrhagic contusion versus   infarct.  Nondisplaced right occipital fracture with overlying scalp hematoma.    --- End of Report ---            ANGELINA MAGANA MD; Attending Radiologist  This document has been electronicallysigned. Sep 19 2023 10:27AM    < end of copied text >      < from: TTE W or WO Ultrasound Enhancing Agent (09.20.23 @ 13:40) >    TRANSTHORACIC ECHOCARDIOGRAM REPORT  ________________________________________________________________________________                                      _______       Pt. Name:       RAMANDEEP ADKINS  Study Date:    9/20/2023  MRN:            [id number]   YOB: 1950  Accession #:    [id number]    Age:           73 years  Account#:       [id number] Gender:        M  Heart Rate:                  Height:        75.00 in (190.50 cm)  Rhythm:                      Weight:        198.00 lb(89.81 kg)  Blood Pressure: 132/79 mmHg  BSA/BMI:       2.19 m² / 24.75 kg/m²  ________________________________________________________________________________________  Referring Physician:    [id number] Monika Le  Interpreting Physician: Carlos RUCKER  Primary Sonographer:    Rio Pina RDCS    CPT:               ECHO TTE WO CON COMP W DOPP - 60424.m  Indication(s):     Syncope and collapse - R55  Procedure:         Transthoracic echocardiogram with 2-D, M-mode and complete                spectral and color flow Doppler.  Ordering Location: [facility]  Study Information: Image quality for this study is fair.    _______________________________________________________________________________________     CONCLUSIONS:      1. Leftventricular systolic function is normal with an ejection fraction of 60 % by Camargo's method of disks.   2. There is normal left ventricular diastolic function.   3. Right ventricular cavity is normal in size and normal systolic function.   4. Mild aortic regurgitation.   5. No prior echocardiogram is available for comparison.    ________________________________________________________________________________________  FINDINGS:     Left Ventricle:  Left ventricular systolic function is normal with a calculated ejection fraction of 60 % by the Camargo's biplane method of disks. There is normal left ventricular diastolic function.     Right Ventricle:  The right ventricular cavity is normal in size and normal systolic function. Tricuspid annular plane systolic excursion (TAPSE) is 2.4 cm (normal >=1.7 cm). Tricuspid annular tissue Doppler S' is 8.8 cm/s (normal >10 cm/s).     Left Atrium:  The left atrium is normal in size with an indexed volume of 29.66 ml/m².     Right Atrium:  The rightatrium is normal in size with an indexed volume of 16.02 ml/m².     Interatrial Septum:  The interatrial septum appears intact.     Aortic Valve:  The aortic valve appears trileaflet. There is fibrocalcific aortic valve sclerosis without stenosis. There is mild aortic regurgitation.     Mitral Valve:  Structurally normal mitral valve with normal leaflet excursion. There is mild mitral regurgitation.     Tricuspid Valve:  Structurally normal tricuspid valve with normal leaflet excursion. There is mild tricuspid regurgitation. Estimated pulmonary artery systolic pressure is 23 mmHg.     Pulmonic Valve:  Structurally normal pulmonic valve with normal leaflet excursion. There is trace pulmonic regurgitation.     Systemic Veins:  The inferior venacava is normal in size measuring 1.90 cm in diameter, (normal <2.1cm) with normal inspiratory collapse (normal >50%) consistent with normal right atrial pressure (~3, range 0-5mmHg).  ____________________________________________________________________  Quantitative Data:  Left Ventricle Measurements: (Indexed to BSA)     IVSd (2D):   0.8 cm  LVPWd (2D):  0.7 cm  LVIDd (2D):  5.3 cm  LVIDs (2D):  3.2 cm  LV Mass:     135 g  61.7 g/m²  BiPlane LV EF%: 60 %     MV E Vmax:    0.44 m/s  MV A Vmax:0.59 m/s  MV E/A:       0.75  e' lateral:   9.25 cm/s  e' medial:    8.16 cm/s  E/e' lateral: 4.76  E/e' medial:  5.39  E/e' Average: 5.05    Aorta Measurements: (normal range) (Indexed to BSA)     Sinuses of Valsalva: 3.60 cm (3.1 - 3.7 cm)  Ao Asc prox:         3.20 cm       Left Atrium Measurements: (Indexed to BSA)  LA Diam 2D: 4.10 cm    Right Ventricle Measurements: Right Atrial Measurements:     TAPSE:           2.4 cm       RA Vol:       35.00 ml  TV Meghan. S':      8.81 cm/s    RA Vol Index: 16.02 ml/m²  RV Base (RVID1): 4.0 cm  RV Mid (RVID2):  2.7 cm       LVOT / RVOT/ Qp/Qs Data: (Indexed to BSA)  LVOT Diameter: 2.20 cm  LVOT Vmax:     0.86 m/s  LVOT VTI:      17.20 cm  LVOT SV:       65.4 ml  29.92 ml/m²    Mitral Valve Measurements:     MV E Vmax: 0.4 m/s  MV A Vmax: 0.6 m/s  MV E/A:    0.7       Tricuspid Valve Measurements:     TR Vmax:          2.2 m/s  TR Peak Gradient: 20.2 mmHg  RA Pressure:      3 mmHg  PASP:             23 mmHg    ________________________________________________________________________________________  Electronically signed on 9/20/2023 at 2:43:51 PM by Theodore Lindo M.D.         *** Final ***    < end of copied text >     
Subjective: Patient seen and examined. No new events except as noted.   Neck pain     REVIEW OF SYSTEMS:    CONSTITUTIONAL: No weakness, fevers or chills  EYES/ENT: No visual changes;  No vertigo or throat pain   NECK: No pain or stiffness  RESPIRATORY: No cough, wheezing, hemoptysis; No shortness of breath  CARDIOVASCULAR: No chest pain or palpitations  GASTROINTESTINAL: No abdominal or epigastric pain. No nausea, vomiting, or hematemesis; No diarrhea or constipation. No melena or hematochezia.  GENITOURINARY: No dysuria, frequency or hematuria  NEUROLOGICAL: No numbness or weakness  SKIN: No itching, burning, rashes, or lesions   All other review of systems is negative unless indicated above.    MEDICATIONS:  MEDICATIONS  (STANDING):  levETIRAcetam 500 milliGRAM(s) Oral two times a day  sodium chloride 0.9%. 1000 milliLiter(s) (100 mL/Hr) IV Continuous <Continuous>      PHYSICAL EXAM:  T(C): 36.8 (09-21-23 @ 05:11), Max: 37.1 (09-20-23 @ 21:35)  HR: 56 (09-21-23 @ 05:11) (56 - 118)  BP: 126/69 (09-21-23 @ 05:11) (123/76 - 147/82)  RR: 18 (09-21-23 @ 05:11) (18 - 18)  SpO2: 95% (09-21-23 @ 05:11) (93% - 97%)  Wt(kg): --  I&O's Summary        Appearance: Normal	  HEENT:   Normal oral mucosa, PERRL, EOMI	  Lymphatic: No lymphadenopathy , no edema  Cardiovascular: Normal S1 S2, No JVD, No murmurs , Peripheral pulses palpable 2+ bilaterally  Respiratory: Lungs clear to auscultation, normal effort 	  Gastrointestinal:  Soft, Non-tender, + BS	  Skin: No rashes, No ecchymoses, No cyanosis, warm to touch  Musculoskeletal: Normal range of motion, normal strength  Psychiatry:  Mood & affect appropriate  Ext: No edema      LABS:    CARDIAC MARKERS:                                15.3   6.52  )-----------( 213      ( 20 Sep 2023 07:10 )             45.9     09-20    141  |  106  |  15  ----------------------------<  118<H>  4.3   |  24  |  0.98    Ca    9.1      20 Sep 2023 07:10  Phos  2.8     09-20  Mg     2.2     09-20      proBNP:   Lipid Profile:   HgA1c:   TSH:             TELEMETRY: 	 SR   ECG:  	  RADIOLOGY: < from: MR Angio Neck No Cont (09.20.23 @ 12:48) >    ACC: [id number] EXAM:  MR ANGIO BRAIN   ORDERED BY: SOHA KINNEY     ACC: [id number] EXAM:  MR ANGIO NECK   ORDERED BY: SOHA FRITZIDAR     ACC: [id number] EXAM:  MR BRAIN   ORDERED BY: SOHA KINNEY     PROCEDURE DATE:  09/20/2023          INTERPRETATION:  CLINICAL INDICATIONS: r/o stroke    COMPARISON: Head CT dated 9/9 6 2023    TECHNIQUE: MRI brain: Multiplanar, multisequence MR imaging of the brain   are obtained without the administration of intravenous Gadavist contrast.    FINDINGS:  There is a small to moderate area of abnormal restricted diffusion in the   right cerebellum on image 42 of series 5 compatible to acute infarction.   Additional small infarct in the left frontal lobe on image 53 of series   5. Stable left frontal lobe hemorrhagic contusion moderate adjacent edema   on image 17 of series 11. Trace intraventricular hemorrhage in the left   occipital horn. The patient's known trace subarachnoid hemorrhage in the   right frontal lobe and left parietal lobe are better seen on the recent   head CT. Nondisplaced right parietal occipital calvarial fracture.    Scattered periventricular and subcortical white  matter T2 /FLAIR hyperintensities are seen without mass effect,   nonspecific, likely representing mild chronic microvascular changes.   Normal T2 flow-voids are seen within  the intracranial vasculature. The   lateral ventricles and cortical sulci are age-appropriate in size and   configuration  midline structures are normal. 1.2 cm mucous retention cyst in the right   maxillary sinus. The visualized paranasal sinuses, mastoid air cells and   orbits are unremarkable.          ======================      CLINICAL INDICATIONS:r/o stroke    TECHNIQUE: MRA brain. 3-D time of flight imaging with 2D MIP   reconstructions.    COMPARISON: None    FINDINGS:  Early termination of the right vertebral artery. Left vertebral artery is   dominant.  The [place] and vertebrobasilar system are otherwise   unremarkable without evidence of stenosis, occlusion or saccular aneurysm   dilation. No evidence for arterial venous malformation.        =====================      CLINICAL INDICATIONS: r/o stroke    TECHNIQUE: Contrast-enhanced MRA neck: 3-D time of flight imaging with 2D   MIP reconstructions.10 cc Intravenous Gadavist was administered.    COMPARISON: None    FINDINGS:  A left-sided aortic arch is demonstrated. There is normal relationship to   the great vessels. The common carotid arteries, internal carotid arteries   and vertebral arteries are normal in caliber. No evidence of stenosis,   occlusion or saccular aneurysm dilation. The left vertebral artery is   dominant.    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   Stable left frontal lobe hemorrhagic contusion with moderate adjacent   edema. Trace intraventricular hemorrhage in the left occipital horn. The   patient's known trace subarachnoid hemorrhage in the right frontal lobe   and left parietal lobe are better seen on the recent head CT.   Nondisplaced right parietal occipital calvarial fracture.      MRA BRAIN: No large vessel occlusion.    MRA NECK: No significant stenosis or large vessel occlusion.    SARA FARR MD; Attending Radiologist  This document has been electronically signed. Sep 20 2023  2:04PM    < end of copied text >    DIAGNOSTIC TESTING:  [ ] Echocardiogram:  < from: TTE W or WO Ultrasound Enhancing Agent (09.20.23 @ 13:40) >    TRANSTHORACIC ECHOCARDIOGRAM REPORT  ________________________________________________________________________________                                      _______       Pt. Name:       RAMANDEEP ADKINS  Study Date:    9/20/2023  MRN:            [id number]   YOB: 1950  Accession #:    [id number]    Age:           73 years  Account#:       [id number] Gender:        M  Heart Rate:                  Height:        75.00 in (190.50 cm)  Rhythm:                      Weight:        198.00 lb(89.81 kg)  Blood Pressure: 132/79 mmHg  BSA/BMI:       2.19 m² / 24.75 kg/m²  ________________________________________________________________________________________  Referring Physician:    [id number] Monika Le  Interpreting Physician: Carlos RUCKER  Primary Sonographer:    Rio Pina RDCS    CPT:               ECHO TTE WO CON COMP W DOPP - 23516.m  Indication(s):     Syncope and collapse - R55  Procedure:         Transthoracic echocardiogram with 2-D, M-mode and complete                spectral and color flow Doppler.  Ordering Location: [facility]  Study Information: Image quality for this study is fair.    _______________________________________________________________________________________     CONCLUSIONS:      1. Leftventricular systolic function is normal with an ejection fraction of 60 % by Camargo's method of disks.   2. There is normal left ventricular diastolic function.   3. Right ventricular cavity is normal in size and normal systolic function.   4. Mild aortic regurgitation.   5. No prior echocardiogram is available for comparison.    ________________________________________________________________________________________  FINDINGS:     Left Ventricle:  Left ventricular systolic function is normal with a calculated ejection fraction of 60 % by the Camargo's biplane method of disks. There is normal left ventricular diastolic function.     Right Ventricle:  The right ventricular cavity is normal in size and normal systolic function. Tricuspid annular plane systolic excursion (TAPSE) is 2.4 cm (normal >=1.7 cm). Tricuspid annular tissue Doppler S' is 8.8 cm/s (normal >10 cm/s).     Left Atrium:  The left atrium is normal in size with an indexed volume of 29.66 ml/m².     Right Atrium:  The rightatrium is normal in size with an indexed volume of 16.02 ml/m².     Interatrial Septum:  The interatrial septum appears intact.     Aortic Valve:  The aortic valve appears trileaflet. There is fibrocalcific aortic valve sclerosis without stenosis. There is mild aortic regurgitation.     Mitral Valve:  Structurally normal mitral valve with normal leaflet excursion. There is mild mitral regurgitation.     Tricuspid Valve:  Structurally normal tricuspid valve with normal leaflet excursion. There is mild tricuspid regurgitation. Estimated pulmonary artery systolic pressure is 23 mmHg.     Pulmonic Valve:  Structurally normal pulmonic valve with normal leaflet excursion. There is trace pulmonic regurgitation.     Systemic Veins:  The inferior venacava is normal in size measuring 1.90 cm in diameter, (normal <2.1cm) with normal inspiratory collapse (normal >50%) consistent with normal right atrial pressure (~3, range 0-5mmHg).  ____________________________________________________________________  Quantitative Data:  Left Ventricle Measurements: (Indexed to BSA)     IVSd (2D):   0.8 cm  LVPWd (2D):  0.7 cm  LVIDd (2D):  5.3 cm  LVIDs (2D):  3.2 cm  LV Mass:     135 g  61.7 g/m²  BiPlane LV EF%: 60 %     MV E Vmax:    0.44 m/s  MV A Vmax:0.59 m/s  MV E/A:       0.75  e' lateral:   9.25 cm/s  e' medial:    8.16 cm/s  E/e' lateral: 4.76  E/e' medial:  5.39  E/e' Average: 5.05    Aorta Measurements: (normal range) (Indexed to BSA)     Sinuses of Valsalva: 3.60 cm (3.1 - 3.7 cm)  Ao Asc prox:         3.20 cm       Left Atrium Measurements: (Indexed to BSA)  LA Diam 2D: 4.10 cm    Right Ventricle Measurements: Right Atrial Measurements:     TAPSE:           2.4 cm       RA Vol:       35.00 ml  TV Meghan. S':      8.81 cm/s    RA Vol Index: 16.02 ml/m²  RV Base (RVID1): 4.0 cm  RV Mid (RVID2):  2.7 cm       LVOT / RVOT/ Qp/Qs Data: (Indexed to BSA)  LVOT Diameter: 2.20 cm  LVOT Vmax:     0.86 m/s  LVOT VTI:      17.20 cm  LVOT SV:       65.4 ml  29.92 ml/m²    Mitral Valve Measurements:     MV E Vmax: 0.4 m/s  MV A Vmax: 0.6 m/s  MV E/A:    0.7       Tricuspid Valve Measurements:     TR Vmax:          2.2 m/s  TR Peak Gradient: 20.2 mmHg  RA Pressure:      3 mmHg  PASP:             23 mmHg    ________________________________________________________________________________________  Electronically signed on 9/20/2023 at 2:43:51 PM by Theodore Lindo M.D.         *** Final ***    < end of copied text >    [ ]  Catheterization:  [ ] Stress Test:    OTHER: 	          
Subjective: Patient seen and examined. No new events except as noted.   Feels ok   NO cp or sob      REVIEW OF SYSTEMS:    CONSTITUTIONAL: No weakness, fevers or chills  EYES/ENT: No visual changes;  No vertigo or throat pain   NECK: No pain or stiffness  RESPIRATORY: No cough, wheezing, hemoptysis; No shortness of breath  CARDIOVASCULAR: No chest pain or palpitations  GASTROINTESTINAL: No abdominal or epigastric pain. No nausea, vomiting, or hematemesis; No diarrhea or constipation. No melena or hematochezia.  GENITOURINARY: No dysuria, frequency or hematuria  NEUROLOGICAL: No numbness or weakness  SKIN: No itching, burning, rashes, or lesions   All other review of systems is negative unless indicated above.    MEDICATIONS:  MEDICATIONS  (STANDING):  levETIRAcetam 500 milliGRAM(s) Oral two times a day      PHYSICAL EXAM:  T(C): 36.7 (09-20-23 @ 09:00), Max: 36.7 (09-19-23 @ 21:23)  HR: 87 (09-20-23 @ 09:00) (60 - 87)  BP: 133/76 (09-20-23 @ 09:00) (121/78 - 178/113)  RR: 18 (09-20-23 @ 09:00) (18 - 20)  SpO2: 96% (09-20-23 @ 09:00) (94% - 98%)  Wt(kg): --  I&O's Summary        Appearance: Normal	  HEENT:   Normal oral mucosa, PERRL, EOMI	  Lymphatic: No lymphadenopathy , no edema  Cardiovascular: Normal S1 S2, No JVD, No murmurs , Peripheral pulses palpable 2+ bilaterally  Respiratory: Lungs clear to auscultation, normal effort 	  Gastrointestinal:  Soft, Non-tender, + BS	  Skin: No rashes, No ecchymoses, No cyanosis, warm to touch  Musculoskeletal: Normal range of motion, normal strength  Psychiatry:  Mood & affect appropriate  Ext: No edema      LABS:    CARDIAC MARKERS:                                15.3   6.52  )-----------( 213      ( 20 Sep 2023 07:10 )             45.9     09-20    141  |  106  |  15  ----------------------------<  118<H>  4.3   |  24  |  0.98    Ca    9.1      20 Sep 2023 07:10  Phos  2.8     09-20  Mg     2.2     09-20    TPro  6.9  /  Alb  4.2  /  TBili  0.6  /  DBili  x   /  AST  20  /  ALT  20  /  AlkPhos  91  09-19    proBNP:   Lipid Profile:   HgA1c:   TSH:             TELEMETRY: 	 SR   ECG:  	  RADIOLOGY: < from: CT Head No Cont (09.19.23 @ 10:15) >  ACC: [id number] EXAM:  CT BRAIN   ORDERED BY: TARI SKINNER     PROCEDURE DATE:  09/19/2023          INTERPRETATION:  Noncontrast CT of the brain.    CLINICAL INDICATION:  Evaluate for subdural hematoma    TECHNIQUE : Axial CT scanning of the brain was obtained from the skull   base to the vertex without the administration of intravenous contrast.    COMPARISON: Brain CT dated 9/19/2023 at 4:26 AM    FINDINGS:  Redemonstrated left frontal hemorrhagic contusion, unchanged   in appearance.    Redemonstrated is a trace right inferior and left parietal subarachnoid   hemorrhage, both slightly decreased compared with the prior examination.    Redemonstrated nondisplaced right occipital fracture with overlying scalp   hematoma. Unchanged wedge-shaped lucency within the right cerebellar   hemisphere at the level of the fracture may represent nonhemorrhagic   contusion and/or small infarct.    Unchanged linear density in the right anterior parafalcine subarachnoid   space may represent trace subarachnoid hemorrhage versus vessel.    IMPRESSION:    Unchanged left frontal hemorrhagic contusion.  Decreasing right frontal and left parietal subarachnoid hemorrhage.  Unchanged wedge-shaped right cerebellar lucency at the level of the   occipital bone fracture may represent a nonhemorrhagic contusion versus   infarct.  Nondisplaced right occipital fracture with overlying scalp hematoma.    --- End of Report ---            < end of copied text >    DIAGNOSTIC TESTING:  [ ] Echocardiogram:  [ ]  Catheterization:  [ ] Stress Test:    OTHER:

## 2023-09-22 NOTE — PROGRESS NOTE ADULT - ASSESSMENT
73 year old male PMH pre-DM Presents to the ED as a transfer from  [facility] for subarachnoid hemorrhage after patient had a fall.  Patient transferred to be  seen by neurosurgery.  Patient having no acute complaints right now other than the laceration on his head.  Denying any dizziness, nausea or vomiting.  Pt has had multiple episodes of syncope over the past few years.  feels lightheaded prior to syncope.  He coud be in a sitting position.  Denies tongue biting or urinary incontinence.

## 2023-09-22 NOTE — DISCHARGE NOTE NURSING/CASE MANAGEMENT/SOCIAL WORK - NSDCVIVACCINE_GEN_ALL_CORE_FT
Tdap; 19-Sep-2023 00:32; Stacia Watkins (RN); [employer]; [id number] (Exp. Date: 23-Aug-2025); IntraMuscular; Deltoid Left.; 0.5 milliLiter(s); VIS (VIS Published: 09-May-2013, VIS Presented: 19-Sep-2023);

## 2023-09-22 NOTE — PROGRESS NOTE ADULT - PROVIDER SPECIALTY LIST ADULT
Internal Medicine
Electrophysiology
Internal Medicine
Neurology
Neurology
Internal Medicine
Cardiology

## 2023-09-22 NOTE — PROGRESS NOTE ADULT - PROBLEM SELECTOR PLAN 1
Suspect vasovagal etiology   check orthostatics   Monitor on Tele   IVF   Check TTE   Thyroid panel.
Acute cerebral infarcts on MRI  Discussed cardiac monitoring/ILR  check orthostatics   Monitor on Tele
Acute cerebral infarcts on MRI  Discussed cardiac monitoring/ILR at length again today while EP and Neurology present. All questions and concerns were answered and discussed. Agrees to proceed   check orthostatics   Monitor on Tele

## 2023-09-22 NOTE — PROGRESS NOTE ADULT - ASSESSMENT
73 year old male PMH pre-DM Presents to the ED as a transfer from  [facility] for subarachnoid hemorrhage after patient had a fall.  Patient transferred to be  seen by neurosurgery.  Patient having no acute complaints right now other than the laceration on his head.  Denying any dizziness, nausea or vomiting.  Pt has had multiple episodes of syncope over the past few years.  feels lightheaded prior to syncope.  He coud be in a sitting position.  Denies tongue biting or urinary incontinence.    syncope  - tele monitor  - TTE  - positive orthostatics -  d/c iv fluids  - cardio consult  - neuro consult  - MRI brain w/o MRA H/N shows acute cva  - s/p  ILR    SAH  - Repeat interval CTH  w/ stable findings.  -no acute neurosurgical intervention  -Outpatient f/u w/ Dr. Cordova in 1-2 weeks.    CVA  - ASA 81mg when cleared by neurosx  - high dose statin, atorvastatin 80mg QHS  - keppra x 7 days per nsx for seizure ppx     d/c home  - follow up with EP and cardio

## 2023-09-22 NOTE — DISCHARGE NOTE PROVIDER - HOSPITAL COURSE
HPI:  73 year old male PMH pre-DM Presents to the ED as a transfer from  [facility] for subarachnoid hemorrhage after patient had a fall.  Patient transferred to be  seen by neurosurgery.  Patient having no acute complaints right now other than the laceration on his head.  Denying any dizziness, nausea or vomiting.  Pt has had multiple episodes of syncope over the past few years.  feels lightheaded prior to syncope.  He coud be in a sitting position.  Denies tongue biting or urinary incontinence. (19 Sep 2023 17:11)    Hospital Course:  Pt admitted with acute SAH 2/2 fall/syncope- CTH w/ stable right frontal/left parietal SAH; Keppra x7 days started.  Nondisplaced right occipital fracture w/ hematoma No intervention per neurosx, outpt f/u in 1-2 weeks.  For syncope work up, troponin negative, monitored on telemetry, found to be orthostatic +,  was given IVF.  Follow up with Dr. Cordova, neurosx, in 1-2 wks.  Seen by PT, DC home with no skilled PT needs.  Acute issues resolved.  Patient has been medically cleared for discharge as per Dr. Le Patient has been given appropriate discharge instructions including medication regimen, access site management and follow up. Medications that patient needs refills on (+/- new medications) have been e-prescribed to preferred pharmacy. Patient will f/u with Dr. Cordova in 1-2 weeks for further management.       Important Medication Changes and Reason:    Active or Pending Issues Requiring Follow-up:    Advanced Directives:   [ x] Full code  [ ] DNR  [ ] Hospice    Discharge Diagnoses:  Syncope  Orthostatic hypotension  Acute SAH 2/2 fall  Nondisplaced right occipital fracture w/ hematoma       HPI:  73 year old male PMH pre-DM Presents to the ED as a transfer from  [facility] for subarachnoid hemorrhage after patient had a fall.  Patient transferred to be  seen by neurosurgery.  Patient having no acute complaints right now other than the laceration on his head.  Denying any dizziness, nausea or vomiting.  Pt has had multiple episodes of syncope over the past few years.  feels lightheaded prior to syncope.  He coud be in a sitting position.  Denies tongue biting or urinary incontinence. (19 Sep 2023 17:11)    Hospital Course:  Pt admitted with acute SAH 2/2 fall/syncope- CTH w/ stable right frontal/left parietal SAH; Keppra x7 days started.  Nondisplaced right occipital fracture w/ hematoma No intervention per neurosx, outpt f/u in 1-2 weeks.  For syncope work up, troponin negative, monitored on telemetry, found to be orthostatic +,  was given IVF.  Follow up with Dr. Cordova, neurosx, in 1-2 wks.  MRI revealed acute infarct. Neurology was consulted; EEG to be done as outpatient. EP consulted for ILR; loop recorder placed 9/22.  Seen by PT, [place] home with no skilled PT needs.  Acute issues resolved.  Patient has been medically cleared for discharge as per Dr. Le Patient has been given appropriate discharge instructions including medication regimen, access site management and follow up. Medications that patient needs refills on (+/- new medications) have been e-prescribed to preferred pharmacy. Patient will f/u with Dr. Cordova in 1-2 weeks for further management.       Important Medication Changes and Reason:  >Continue keppra until 9/26 for seizure prophylaxis     Active or Pending Issues Requiring Follow-up:  >PCP follow up within one week for further outpatient management   >Neurosurgery follow up with Dr. Cordova in 1-2 weeks for further outpatient management  >Cardiology follow up for further outpatient management of ILR    >Neurology follow up for EEG outpatient     Advanced Directives:   [ x] Full code  [ ] DNR  [ ] Hospice    Discharge Diagnoses:  Syncope  Orthostatic hypotension  Acute SAH 2/2 fall  Nondisplaced right occipital fracture w/ hematoma  Acute cerebellar infarct     Discharge/dispo/med rec discussed with attending Dr. Le. Patient is medically cleared for discharge with outpatient follow up

## 2023-10-05 PROBLEM — Z78.9 OTHER SPECIFIED HEALTH STATUS: Chronic | Status: ACTIVE | Noted: 2023-09-19

## 2023-10-12 ENCOUNTER — TRANSCRIPTION ENCOUNTER (OUTPATIENT)
Age: 73
End: 2023-10-12

## 2023-10-12 ENCOUNTER — APPOINTMENT (OUTPATIENT)
Dept: CT IMAGING | Facility: IMAGING CENTER | Age: 73
End: 2023-10-12

## 2023-10-12 ENCOUNTER — OUTPATIENT (OUTPATIENT)
Dept: OUTPATIENT SERVICES | Facility: HOSPITAL | Age: 73
LOS: 1 days | End: 2023-10-12
Payer: MEDICARE

## 2023-10-12 DIAGNOSIS — Z00.8 ENCOUNTER FOR OTHER GENERAL EXAMINATION: ICD-10-CM

## 2023-10-12 PROCEDURE — 70450 CT HEAD/BRAIN W/O DYE: CPT

## 2023-10-12 PROCEDURE — 70450 CT HEAD/BRAIN W/O DYE: CPT | Mod: 26

## 2023-11-13 PROCEDURE — 86803 HEPATITIS C AB TEST: CPT

## 2023-11-13 PROCEDURE — 84443 ASSAY THYROID STIM HORMONE: CPT

## 2023-11-13 PROCEDURE — 36415 COLL VENOUS BLD VENIPUNCTURE: CPT

## 2023-11-13 PROCEDURE — 86901 BLOOD TYPING SEROLOGIC RH(D): CPT

## 2023-11-13 PROCEDURE — 85025 COMPLETE CBC W/AUTO DIFF WBC: CPT

## 2023-11-13 PROCEDURE — 70450 CT HEAD/BRAIN W/O DYE: CPT | Mod: MA

## 2023-11-13 PROCEDURE — 96374 THER/PROPH/DIAG INJ IV PUSH: CPT

## 2023-11-13 PROCEDURE — 84100 ASSAY OF PHOSPHORUS: CPT

## 2023-11-13 PROCEDURE — 85027 COMPLETE CBC AUTOMATED: CPT

## 2023-11-13 PROCEDURE — 80053 COMPREHEN METABOLIC PANEL: CPT

## 2023-11-13 PROCEDURE — 80048 BASIC METABOLIC PNL TOTAL CA: CPT

## 2023-11-13 PROCEDURE — 82607 VITAMIN B-12: CPT

## 2023-11-13 PROCEDURE — C1764: CPT

## 2023-11-13 PROCEDURE — 85730 THROMBOPLASTIN TIME PARTIAL: CPT

## 2023-11-13 PROCEDURE — 70547 MR ANGIOGRAPHY NECK W/O DYE: CPT

## 2023-11-13 PROCEDURE — 93306 TTE W/DOPPLER COMPLETE: CPT

## 2023-11-13 PROCEDURE — 83735 ASSAY OF MAGNESIUM: CPT

## 2023-11-13 PROCEDURE — 70551 MRI BRAIN STEM W/O DYE: CPT

## 2023-11-13 PROCEDURE — 97161 PT EVAL LOW COMPLEX 20 MIN: CPT

## 2023-11-13 PROCEDURE — 33285 INSJ SUBQ CAR RHYTHM MNTR: CPT

## 2023-11-13 PROCEDURE — 99285 EMERGENCY DEPT VISIT HI MDM: CPT | Mod: 25

## 2023-11-13 PROCEDURE — 86850 RBC ANTIBODY SCREEN: CPT

## 2023-11-13 PROCEDURE — 70544 MR ANGIOGRAPHY HEAD W/O DYE: CPT

## 2023-11-13 PROCEDURE — 82746 ASSAY OF FOLIC ACID SERUM: CPT

## 2023-11-13 PROCEDURE — 85610 PROTHROMBIN TIME: CPT

## 2023-11-13 PROCEDURE — 86900 BLOOD TYPING SEROLOGIC ABO: CPT

## 2024-05-03 ENCOUNTER — APPOINTMENT (OUTPATIENT)
Dept: INTERNAL MEDICINE | Facility: CLINIC | Age: 74
End: 2024-05-03

## 2024-05-06 ENCOUNTER — APPOINTMENT (OUTPATIENT)
Dept: RADIOLOGY | Facility: HOSPITAL | Age: 74
End: 2024-05-06
Payer: MEDICARE

## 2024-05-06 ENCOUNTER — OUTPATIENT (OUTPATIENT)
Dept: OUTPATIENT SERVICES | Facility: HOSPITAL | Age: 74
LOS: 1 days | End: 2024-05-06
Payer: MEDICARE

## 2024-05-06 DIAGNOSIS — R13.14 DYSPHAGIA, PHARYNGOESOPHAGEAL PHASE: ICD-10-CM

## 2024-05-06 PROCEDURE — 74221 X-RAY XM ESOPHAGUS 2CNTRST: CPT | Mod: 26

## 2024-05-06 PROCEDURE — 74221 X-RAY XM ESOPHAGUS 2CNTRST: CPT

## 2024-05-16 ENCOUNTER — APPOINTMENT (OUTPATIENT)
Dept: INTERNAL MEDICINE | Facility: CLINIC | Age: 74
End: 2024-05-16
Payer: MEDICARE

## 2024-05-16 ENCOUNTER — NON-APPOINTMENT (OUTPATIENT)
Age: 74
End: 2024-05-16

## 2024-05-16 VITALS
HEIGHT: 75 IN | BODY MASS INDEX: 24.74 KG/M2 | HEART RATE: 70 BPM | TEMPERATURE: 98 F | SYSTOLIC BLOOD PRESSURE: 110 MMHG | OXYGEN SATURATION: 97 % | DIASTOLIC BLOOD PRESSURE: 70 MMHG | WEIGHT: 199 LBS

## 2024-05-16 DIAGNOSIS — R13.10 DYSPHAGIA, UNSPECIFIED: ICD-10-CM

## 2024-05-16 DIAGNOSIS — C44.90 UNSPECIFIED MALIGNANT NEOPLASM OF SKIN, UNSPECIFIED: ICD-10-CM

## 2024-05-16 DIAGNOSIS — Z13.6 ENCOUNTER FOR SCREENING FOR CARDIOVASCULAR DISORDERS: ICD-10-CM

## 2024-05-16 DIAGNOSIS — Z12.9 ENCOUNTER FOR SCREENING FOR MALIGNANT NEOPLASM, SITE UNSPECIFIED: ICD-10-CM

## 2024-05-16 DIAGNOSIS — Z82.49 FAMILY HISTORY OF ISCHEMIC HEART DISEASE AND OTHER DISEASES OF THE CIRCULATORY SYSTEM: ICD-10-CM

## 2024-05-16 DIAGNOSIS — S06.5XAA TRAUMATIC SUBDURAL HEMORRHAGE WITH LOSS OF CONSCIOUSNESS STATUS UNKNOWN, INITIAL ENCOUNTER: ICD-10-CM

## 2024-05-16 DIAGNOSIS — E78.5 HYPERLIPIDEMIA, UNSPECIFIED: ICD-10-CM

## 2024-05-16 DIAGNOSIS — Z00.00 ENCOUNTER FOR GENERAL ADULT MEDICAL EXAMINATION W/OUT ABNORMAL FINDINGS: ICD-10-CM

## 2024-05-16 DIAGNOSIS — R55 SYNCOPE AND COLLAPSE: ICD-10-CM

## 2024-05-16 DIAGNOSIS — H91.90 UNSPECIFIED HEARING LOSS, UNSPECIFIED EAR: ICD-10-CM

## 2024-05-16 DIAGNOSIS — Z87.891 PERSONAL HISTORY OF NICOTINE DEPENDENCE: ICD-10-CM

## 2024-05-16 DIAGNOSIS — Z78.9 OTHER SPECIFIED HEALTH STATUS: ICD-10-CM

## 2024-05-16 DIAGNOSIS — Z71.85 ENCOUNTER FOR IMMUNIZATION SAFETY COUNSELING: ICD-10-CM

## 2024-05-16 DIAGNOSIS — Z13.228 ENCOUNTER FOR SCREENING FOR OTHER METABOLIC DISORDERS: ICD-10-CM

## 2024-05-16 PROCEDURE — 99387 INIT PM E/M NEW PAT 65+ YRS: CPT | Mod: GY

## 2024-05-16 PROCEDURE — 93000 ELECTROCARDIOGRAM COMPLETE: CPT

## 2024-05-16 RX ORDER — ATORVASTATIN CALCIUM 20 MG/1
20 TABLET, FILM COATED ORAL
Qty: 90 | Refills: 1 | Status: ACTIVE | COMMUNITY
Start: 2024-05-16

## 2024-05-16 RX ORDER — GLUCOSA SU 2KCL/CHONDROITIN SU 500-400 MG
500 CAPSULE ORAL DAILY
Refills: 0 | Status: ACTIVE | COMMUNITY
Start: 2024-05-16

## 2024-05-16 RX ORDER — KRILL/OM-3/DHA/EPA/PHOSPHO/AST 1000-230MG
81 CAPSULE ORAL
Qty: 90 | Refills: 1 | Status: ACTIVE | COMMUNITY
Start: 2024-05-16

## 2024-05-17 LAB
25(OH)D3 SERPL-MCNC: 38.1 NG/ML
ALBUMIN SERPL ELPH-MCNC: 4.6 G/DL
ALP BLD-CCNC: 116 U/L
ALT SERPL-CCNC: 26 U/L
ANION GAP SERPL CALC-SCNC: 13 MMOL/L
APPEARANCE: CLEAR
AST SERPL-CCNC: 14 U/L
BACTERIA: NEGATIVE /HPF
BASOPHILS # BLD AUTO: 0.03 K/UL
BASOPHILS NFR BLD AUTO: 0.6 %
BILIRUB SERPL-MCNC: 0.9 MG/DL
BILIRUBIN URINE: NEGATIVE
BLOOD URINE: NEGATIVE
BUN SERPL-MCNC: 18 MG/DL
CALCIUM SERPL-MCNC: 9.9 MG/DL
CAST: 0 /LPF
CHLORIDE SERPL-SCNC: 102 MMOL/L
CHOLEST SERPL-MCNC: 131 MG/DL
CK SERPL-CCNC: 82 U/L
CO2 SERPL-SCNC: 26 MMOL/L
COLOR: NORMAL
CREAT SERPL-MCNC: 1.14 MG/DL
CREAT SPEC-SCNC: 214 MG/DL
EGFR: 67 ML/MIN/1.73M2
EOSINOPHIL # BLD AUTO: 0.14 K/UL
EOSINOPHIL NFR BLD AUTO: 2.6 %
EPITHELIAL CELLS: 1 /HPF
ESTIMATED AVERAGE GLUCOSE: 146 MG/DL
FERRITIN SERPL-MCNC: 165 NG/ML
FOLATE SERPL-MCNC: 9.5 NG/ML
GLUCOSE QUALITATIVE U: 500 MG/DL
GLUCOSE SERPL-MCNC: 133 MG/DL
HBA1C MFR BLD HPLC: 6.7 %
HCT VFR BLD CALC: 48.5 %
HDLC SERPL-MCNC: 44 MG/DL
HGB BLD-MCNC: 15.9 G/DL
IMM GRANULOCYTES NFR BLD AUTO: 0.2 %
IRON SATN MFR SERPL: 29 %
IRON SERPL-MCNC: 101 UG/DL
KETONES URINE: NEGATIVE MG/DL
LDLC SERPL CALC-MCNC: 64 MG/DL
LEUKOCYTE ESTERASE URINE: NEGATIVE
LYMPHOCYTES # BLD AUTO: 1.54 K/UL
LYMPHOCYTES NFR BLD AUTO: 29.1 %
MAN DIFF?: NORMAL
MCHC RBC-ENTMCNC: 29.9 PG
MCHC RBC-ENTMCNC: 32.8 GM/DL
MCV RBC AUTO: 91.2 FL
MICROALBUMIN 24H UR DL<=1MG/L-MCNC: <1.2 MG/DL
MICROALBUMIN/CREAT 24H UR-RTO: NORMAL MG/G
MICROSCOPIC-UA: NORMAL
MONOCYTES # BLD AUTO: 0.39 K/UL
MONOCYTES NFR BLD AUTO: 7.4 %
NEUTROPHILS # BLD AUTO: 3.18 K/UL
NEUTROPHILS NFR BLD AUTO: 60.1 %
NITRITE URINE: NEGATIVE
NONHDLC SERPL-MCNC: 87 MG/DL
PH URINE: 5.5
PLATELET # BLD AUTO: 254 K/UL
POTASSIUM SERPL-SCNC: 5.1 MMOL/L
PROT SERPL-MCNC: 7.1 G/DL
PROTEIN URINE: NEGATIVE MG/DL
PSA SERPL-MCNC: 0.25 NG/ML
RBC # BLD: 5.32 M/UL
RBC # FLD: 13.1 %
RED BLOOD CELLS URINE: 1 /HPF
SODIUM SERPL-SCNC: 141 MMOL/L
SPECIFIC GRAVITY URINE: 1.02
T4 FREE SERPL-MCNC: 1.1 NG/DL
TIBC SERPL-MCNC: 350 UG/DL
TRIGL SERPL-MCNC: 126 MG/DL
TSH SERPL-ACNC: 2.2 UIU/ML
UIBC SERPL-MCNC: 248 UG/DL
URATE SERPL-MCNC: 5.2 MG/DL
UROBILINOGEN URINE: 1 MG/DL
VIT B12 SERPL-MCNC: 1094 PG/ML
WBC # FLD AUTO: 5.29 K/UL
WHITE BLOOD CELLS URINE: 0 /HPF

## 2024-05-20 DIAGNOSIS — E11.9 TYPE 2 DIABETES MELLITUS W/OUT COMPLICATIONS: ICD-10-CM

## 2024-05-20 RX ORDER — METFORMIN HYDROCHLORIDE 500 MG/1
500 TABLET, COATED ORAL
Qty: 90 | Refills: 1 | Status: ACTIVE | COMMUNITY
Start: 2024-05-20 | End: 1900-01-01

## 2024-06-09 PROBLEM — H91.90 HARD OF HEARING: Status: ACTIVE | Noted: 2024-05-16

## 2024-06-09 PROBLEM — Z13.6 SCREENING FOR HEART DISEASE: Status: ACTIVE | Noted: 2024-05-16

## 2024-06-09 PROBLEM — R13.10 DYSPHAGIA: Status: ACTIVE | Noted: 2024-05-16

## 2024-06-09 PROBLEM — R55 RECURRENT SYNCOPE: Status: ACTIVE | Noted: 2024-05-16

## 2024-06-09 PROBLEM — C44.90 SKIN CANCER: Status: ACTIVE | Noted: 2024-05-16

## 2024-06-09 PROBLEM — Z12.9 CANCER SCREENING: Status: ACTIVE | Noted: 2024-05-16

## 2024-06-09 PROBLEM — Z71.85 IMMUNIZATION COUNSELING: Status: ACTIVE | Noted: 2024-06-09

## 2024-06-09 PROBLEM — Z13.228 SCREENING FOR METABOLIC DISORDER: Status: ACTIVE | Noted: 2024-05-16

## 2024-06-09 PROBLEM — Z00.00 ENCOUNTER FOR PREVENTIVE HEALTH EXAMINATION: Status: ACTIVE | Noted: 2023-10-11

## 2024-06-09 PROBLEM — S06.5XAA SUBDURAL HEMATOMA: Status: ACTIVE | Noted: 2024-05-16

## 2024-06-09 NOTE — HEALTH RISK ASSESSMENT
[0] : 2) Feeling down, depressed, or hopeless: Not at all (0) [PHQ-2 Negative - No further assessment needed] : PHQ-2 Negative - No further assessment needed [Never] : Never [[id number]] : 0

## 2024-06-09 NOTE — HISTORY OF PRESENT ILLNESS
[FreeTextEntry1] : new pcp annual physical exam [de-identified] : RAMANDEEP ADKINS is a 73 year old male with PMH of predibaetes, TIA, SAH, [place], skin cancer s/p mohs surgery, dysphagia, recurrent syncope,  presented to the office today for new pcp. annual physical exam Fainting for 25 years, admitted 9-19/2023-9/22/2023. Says he follows with a cardiologist Dr Maynor Valencia and has undergone extensive workup Patient feels well. No complaints. Denies chest pain, sob, marcum, dizziness, orthopnea, diaphoresis, palpitations, LE swelling, syncope, n/v, headache.

## 2024-06-25 ENCOUNTER — NON-APPOINTMENT (OUTPATIENT)
Age: 74
End: 2024-06-25

## 2024-07-15 ENCOUNTER — APPOINTMENT (OUTPATIENT)
Dept: INTERNAL MEDICINE | Facility: CLINIC | Age: 74
End: 2024-07-15
Payer: MEDICARE

## 2024-07-15 VITALS
SYSTOLIC BLOOD PRESSURE: 120 MMHG | OXYGEN SATURATION: 95 % | HEART RATE: 64 BPM | HEIGHT: 75 IN | DIASTOLIC BLOOD PRESSURE: 80 MMHG | WEIGHT: 195 LBS | TEMPERATURE: 98.1 F | BODY MASS INDEX: 24.25 KG/M2

## 2024-07-15 VITALS — OXYGEN SATURATION: 97 % | HEART RATE: 65 BPM

## 2024-07-15 DIAGNOSIS — R05.9 COUGH, UNSPECIFIED: ICD-10-CM

## 2024-07-15 DIAGNOSIS — R55 SYNCOPE AND COLLAPSE: ICD-10-CM

## 2024-07-15 PROCEDURE — G2211 COMPLEX E/M VISIT ADD ON: CPT

## 2024-07-15 PROCEDURE — 99213 OFFICE O/P EST LOW 20 MIN: CPT

## 2024-07-15 RX ORDER — METHYLPREDNISOLONE 4 MG/1
4 TABLET ORAL
Qty: 1 | Refills: 0 | Status: ACTIVE | COMMUNITY
Start: 2024-07-15 | End: 1900-01-01

## 2024-07-15 RX ORDER — BENZONATATE 200 MG/1
200 CAPSULE ORAL 3 TIMES DAILY
Qty: 42 | Refills: 0 | Status: ACTIVE | COMMUNITY
Start: 2024-07-15 | End: 1900-01-01

## 2024-07-15 RX ORDER — AMOXICILLIN AND CLAVULANATE POTASSIUM 875; 125 MG/1; MG/1
875-125 TABLET, COATED ORAL TWICE DAILY
Qty: 14 | Refills: 0 | Status: ACTIVE | COMMUNITY
Start: 2024-07-15 | End: 1900-01-01

## 2024-07-18 ENCOUNTER — OUTPATIENT (OUTPATIENT)
Dept: OUTPATIENT SERVICES | Facility: HOSPITAL | Age: 74
LOS: 1 days | End: 2024-07-18
Payer: MEDICARE

## 2024-07-18 ENCOUNTER — APPOINTMENT (OUTPATIENT)
Dept: RADIOLOGY | Facility: IMAGING CENTER | Age: 74
End: 2024-07-18
Payer: MEDICARE

## 2024-07-18 DIAGNOSIS — R05.9 COUGH, UNSPECIFIED: ICD-10-CM

## 2024-07-18 PROCEDURE — 71046 X-RAY EXAM CHEST 2 VIEWS: CPT

## 2024-07-18 PROCEDURE — 71046 X-RAY EXAM CHEST 2 VIEWS: CPT | Mod: 26

## 2024-08-06 ENCOUNTER — APPOINTMENT (OUTPATIENT)
Dept: GASTROENTEROLOGY | Facility: CLINIC | Age: 74
End: 2024-08-06

## 2024-08-06 PROBLEM — Z12.11 COLON CANCER SCREENING: Status: ACTIVE | Noted: 2024-08-06

## 2024-08-06 PROCEDURE — 99204 OFFICE O/P NEW MOD 45 MIN: CPT

## 2024-08-06 NOTE — HISTORY OF PRESENT ILLNESS
[FreeTextEntry1] : 75 yo male , referred for colon cancer screening, last colonoscopy 10-12 years ago. Has normal bms, no constipation. No gerd, no weight loss, no family hx of colon cancer. Recent pos cologuard. Colonoscopies performed for a Cologuard  positive result may find that the most clinically significant lesions are: colorectal cancer (4%), advanced adenoma (including sessile polyps greater than 1 cm in diameter (20%), non-advanced adenoma (31%) or no colorectal neoplasia (45%). The current Cologuard screening interval is every 3 years.

## 2024-08-06 NOTE — PHYSICAL EXAM
[Alert] : alert [Normal Voice/Communication] : normal voice/communication [Healthy Appearing] : healthy appearing [No Acute Distress] : no acute distress [Sclera] : the sclera and conjunctiva were normal [Hearing Threshold Finger Rub Not [place]] : hearing was normal [Normal Lips/Gums] : the lips and gums were normal [Oropharynx] : the oropharynx was normal [Normal Appearance] : the appearance of the neck was normal [No Neck Mass] : no neck mass was observed [No Respiratory Distress] : no respiratory distress [No Acc Muscle Use] : no accessory muscle use [Respiration, Rhythm And Depth] : normal respiratory rhythm and effort [Auscultation Breath Sounds / Voice Sounds] : lungs were clear to auscultation bilaterally [Heart Rate And Rhythm] : heart rate was normal and rhythm regular [Normal S1, S2] : normal S1 and S2 [Murmurs] : no murmurs [Bowel Sounds] : normal bowel sounds [Abdomen Tenderness] : non-tender [No Masses] : no abdominal mass palpated [Abdomen Soft] : soft [] : no hepatosplenomegaly [Oriented To Time, Place, And Person] : oriented to person, place, and time

## 2024-08-06 NOTE — ASSESSMENT
[FreeTextEntry1] : 73 yo male , referred for colon cancer screening, last colonoscopy 10-12 years ago. Has normal bms, no constipation. No gerd, no weight loss, no family hx of colon cancer. Recent pos cologuard. Colonoscopies performed for a Cologuard  positive result may find that the most clinically significant lesions are: colorectal cancer (4%), advanced adenoma (including sessile polyps greater than 1 cm in diameter (20%), non-advanced adenoma (31%) or no colorectal neoplasia (45%). The current Cologuard screening interval is every 3 years.  IMP: 1. colon cancer screening, pos cologuard We discussed the indications as well as limitations of the Cologuard test.  Cologuard detects 42% of large polyps which if found will require colonoscopy.Cologuard has a 13% false-positive rate that increases as people age.  Cologuard can detect colon cancer in 92%o f the time, but 8 % of colon cancers will go undetected..  Cologuard is not indicated if a patient has a previous history of  adenomatous polyps, inflammatory bowel disease, lower GI  symptoms ,family history of colon cancer or history of hereditary cancer syndromes. The current Cologuard screening interval is every 3 years. 2. NIDDM 3. HLD  PLAN; He was advised to undergo colonoscopy to which he  agreed. The procedure will be performed in [facility] with the assistance of an anesthesiologist. The procedure was explained in detail and he understood the risks of the procedure not limited  to infection, bleeding, perforation, risk of anesthesia and risk of IV site problems,emergency surgery, missed lesions  or non-diagnosis of colon cancer. He  was advised that he could not drive home alone, if the patient chooses to receive sedation. Further diagnostic and treatment recommendations will be based upon the procedure and any biopsies, if they are taken.

## 2024-10-09 ENCOUNTER — APPOINTMENT (OUTPATIENT)
Dept: GASTROENTEROLOGY | Facility: AMBULATORY SURGERY CENTER | Age: 74
End: 2024-10-09
Payer: MEDICARE

## 2024-10-09 PROCEDURE — 45378 DIAGNOSTIC COLONOSCOPY: CPT

## 2024-10-31 LAB
ESTIMATED AVERAGE GLUCOSE: 143 MG/DL
HBA1C MFR BLD HPLC: 6.6 %

## 2024-11-01 ENCOUNTER — APPOINTMENT (OUTPATIENT)
Dept: INTERNAL MEDICINE | Facility: CLINIC | Age: 74
End: 2024-11-01

## 2025-03-17 NOTE — REASON FOR VISIT
6 month bypass labs placed. 5 yr bmd staff message placed.   [Initial Evaluation] : an initial evaluation [FreeTextEntry1] : colon cancer screening, pos cologuard

## 2025-05-03 ENCOUNTER — NON-APPOINTMENT (OUTPATIENT)
Age: 75
End: 2025-05-03

## 2025-05-05 ENCOUNTER — NON-APPOINTMENT (OUTPATIENT)
Age: 75
End: 2025-05-05

## 2025-05-05 ENCOUNTER — APPOINTMENT (OUTPATIENT)
Dept: INTERNAL MEDICINE | Facility: CLINIC | Age: 75
End: 2025-05-05
Payer: MEDICARE

## 2025-05-05 VITALS
BODY MASS INDEX: 25.49 KG/M2 | TEMPERATURE: 97.6 F | RESPIRATION RATE: 16 BRPM | SYSTOLIC BLOOD PRESSURE: 110 MMHG | HEIGHT: 75 IN | OXYGEN SATURATION: 95 % | WEIGHT: 205 LBS | DIASTOLIC BLOOD PRESSURE: 66 MMHG | HEART RATE: 62 BPM

## 2025-05-05 DIAGNOSIS — S06.5XAA TRAUMATIC SUBDURAL HEMORRHAGE WITH LOSS OF CONSCIOUSNESS STATUS UNKNOWN, INITIAL ENCOUNTER: ICD-10-CM

## 2025-05-05 DIAGNOSIS — Z71.85 ENCOUNTER FOR IMMUNIZATION SAFETY COUNSELING: ICD-10-CM

## 2025-05-05 DIAGNOSIS — Z12.9 ENCOUNTER FOR SCREENING FOR MALIGNANT NEOPLASM, SITE UNSPECIFIED: ICD-10-CM

## 2025-05-05 DIAGNOSIS — E11.9 TYPE 2 DIABETES MELLITUS W/OUT COMPLICATIONS: ICD-10-CM

## 2025-05-05 DIAGNOSIS — Z00.00 ENCOUNTER FOR GENERAL ADULT MEDICAL EXAMINATION W/OUT ABNORMAL FINDINGS: ICD-10-CM

## 2025-05-05 DIAGNOSIS — H91.90 UNSPECIFIED HEARING LOSS, UNSPECIFIED EAR: ICD-10-CM

## 2025-05-05 DIAGNOSIS — Z13.228 ENCOUNTER FOR SCREENING FOR OTHER METABOLIC DISORDERS: ICD-10-CM

## 2025-05-05 DIAGNOSIS — R55 SYNCOPE AND COLLAPSE: ICD-10-CM

## 2025-05-05 DIAGNOSIS — Z13.6 ENCOUNTER FOR SCREENING FOR CARDIOVASCULAR DISORDERS: ICD-10-CM

## 2025-05-05 DIAGNOSIS — C44.90 UNSPECIFIED MALIGNANT NEOPLASM OF SKIN, UNSPECIFIED: ICD-10-CM

## 2025-05-05 DIAGNOSIS — Z13.820 ENCOUNTER FOR SCREENING FOR OSTEOPOROSIS: ICD-10-CM

## 2025-05-05 DIAGNOSIS — R13.10 DYSPHAGIA, UNSPECIFIED: ICD-10-CM

## 2025-05-05 DIAGNOSIS — E78.5 HYPERLIPIDEMIA, UNSPECIFIED: ICD-10-CM

## 2025-05-05 PROCEDURE — 93000 ELECTROCARDIOGRAM COMPLETE: CPT

## 2025-05-05 PROCEDURE — 99397 PER PM REEVAL EST PAT 65+ YR: CPT

## 2025-05-06 ENCOUNTER — TRANSCRIPTION ENCOUNTER (OUTPATIENT)
Age: 75
End: 2025-05-06

## 2025-05-06 DIAGNOSIS — R82.90 UNSPECIFIED ABNORMAL FINDINGS IN URINE: ICD-10-CM

## 2025-05-06 LAB
25(OH)D3 SERPL-MCNC: 34.1 NG/ML
ALBUMIN SERPL ELPH-MCNC: 4.4 G/DL
ALP BLD-CCNC: 101 U/L
ALT SERPL-CCNC: 28 U/L
ANION GAP SERPL CALC-SCNC: 16 MMOL/L
APPEARANCE: ABNORMAL
AST SERPL-CCNC: 20 U/L
BACTERIA: NEGATIVE /HPF
BASOPHILS # BLD AUTO: 0.03 K/UL
BASOPHILS NFR BLD AUTO: 0.6 %
BILIRUB SERPL-MCNC: 0.6 MG/DL
BILIRUBIN URINE: NEGATIVE
BLOOD URINE: NEGATIVE
BUN SERPL-MCNC: 24 MG/DL
CALCIUM OXALATE CRYSTALS: PRESENT
CALCIUM SERPL-MCNC: 9.5 MG/DL
CAST: 0 /LPF
CHLORIDE SERPL-SCNC: 103 MMOL/L
CHOLEST SERPL-MCNC: 125 MG/DL
CK SERPL-CCNC: 102 U/L
CO2 SERPL-SCNC: 22 MMOL/L
COLOR: NORMAL
CREAT SERPL-MCNC: 1.05 MG/DL
CREAT SPEC-SCNC: 300 MG/DL
EGFRCR SERPLBLD CKD-EPI 2021: 74 ML/MIN/1.73M2
EOSINOPHIL # BLD AUTO: 0.09 K/UL
EOSINOPHIL NFR BLD AUTO: 1.7 %
EPITHELIAL CELLS: 1 /HPF
ESTIMATED AVERAGE GLUCOSE: 160 MG/DL
FERRITIN SERPL-MCNC: 130 NG/ML
FOLATE SERPL-MCNC: 10.4 NG/ML
GLUCOSE QUALITATIVE U: >=1000 MG/DL
GLUCOSE SERPL-MCNC: 132 MG/DL
HBA1C MFR BLD HPLC: 7.2 %
HCT VFR BLD CALC: 46.7 %
HDLC SERPL-MCNC: 47 MG/DL
HGB BLD-MCNC: 15 G/DL
IMM GRANULOCYTES NFR BLD AUTO: 0.6 %
IRON SATN MFR SERPL: 30 %
IRON SERPL-MCNC: 104 UG/DL
KETONES URINE: ABNORMAL MG/DL
LDLC SERPL-MCNC: 57 MG/DL
LEUKOCYTE ESTERASE URINE: ABNORMAL
LYMPHOCYTES # BLD AUTO: 1.48 K/UL
LYMPHOCYTES NFR BLD AUTO: 27.9 %
MAN DIFF?: NORMAL
MCHC RBC-ENTMCNC: 30.2 PG
MCHC RBC-ENTMCNC: 32.1 G/DL
MCV RBC AUTO: 94.2 FL
MICROALBUMIN 24H UR DL<=1MG/L-MCNC: 1.2 MG/DL
MICROALBUMIN/CREAT 24H UR-RTO: NORMAL MG/G
MICROSCOPIC-UA: NORMAL
MONOCYTES # BLD AUTO: 0.5 K/UL
MONOCYTES NFR BLD AUTO: 9.4 %
NEUTROPHILS # BLD AUTO: 3.17 K/UL
NEUTROPHILS NFR BLD AUTO: 59.8 %
NITRITE URINE: NEGATIVE
NONHDLC SERPL-MCNC: 78 MG/DL
PH URINE: 5
PLATELET # BLD AUTO: 236 K/UL
POTASSIUM SERPL-SCNC: 4.6 MMOL/L
PROT SERPL-MCNC: 7 G/DL
PROTEIN URINE: NORMAL MG/DL
PSA SERPL-MCNC: 0.2 NG/ML
RBC # BLD: 4.96 M/UL
RBC # FLD: 13.4 %
RED BLOOD CELLS URINE: 3 /HPF
REVIEW: NORMAL
SODIUM SERPL-SCNC: 141 MMOL/L
SPECIFIC GRAVITY URINE: >1.03
T4 FREE SERPL-MCNC: 1.1 NG/DL
TIBC SERPL-MCNC: 342 UG/DL
TRIGL SERPL-MCNC: 117 MG/DL
TSH SERPL-ACNC: 1.85 UIU/ML
UIBC SERPL-MCNC: 238 UG/DL
URATE SERPL-MCNC: 5.3 MG/DL
UROBILINOGEN URINE: 1 MG/DL
VIT B12 SERPL-MCNC: 1252 PG/ML
WBC # FLD AUTO: 5.3 K/UL
WHITE BLOOD CELLS URINE: 0 /HPF

## 2025-05-28 ENCOUNTER — OUTPATIENT (OUTPATIENT)
Dept: OUTPATIENT SERVICES | Facility: HOSPITAL | Age: 75
LOS: 1 days | End: 2025-05-28
Payer: MEDICARE

## 2025-05-28 ENCOUNTER — APPOINTMENT (OUTPATIENT)
Dept: RADIOLOGY | Facility: IMAGING CENTER | Age: 75
End: 2025-05-28

## 2025-05-28 DIAGNOSIS — Z13.820 ENCOUNTER FOR SCREENING FOR OSTEOPOROSIS: ICD-10-CM

## 2025-05-28 PROCEDURE — 77085 DXA BONE DENSITY AXL VRT FX: CPT

## 2025-05-28 PROCEDURE — 77085 DXA BONE DENSITY AXL VRT FX: CPT | Mod: 26

## 2025-05-29 ENCOUNTER — APPOINTMENT (OUTPATIENT)
Dept: UROLOGY | Facility: CLINIC | Age: 75
End: 2025-05-29

## 2025-08-08 ENCOUNTER — RX RENEWAL (OUTPATIENT)
Age: 75
End: 2025-08-08